# Patient Record
Sex: FEMALE | Race: WHITE | NOT HISPANIC OR LATINO | Employment: FULL TIME | URBAN - METROPOLITAN AREA
[De-identification: names, ages, dates, MRNs, and addresses within clinical notes are randomized per-mention and may not be internally consistent; named-entity substitution may affect disease eponyms.]

---

## 2017-02-01 ENCOUNTER — OFFICE VISIT (OUTPATIENT)
Dept: PAIN MEDICINE | Facility: CLINIC | Age: 42
End: 2017-02-01

## 2017-02-01 VITALS
SYSTOLIC BLOOD PRESSURE: 134 MMHG | TEMPERATURE: 98.7 F | HEIGHT: 68 IN | WEIGHT: 186 LBS | DIASTOLIC BLOOD PRESSURE: 87 MMHG | BODY MASS INDEX: 28.19 KG/M2 | HEART RATE: 68 BPM | OXYGEN SATURATION: 100 % | RESPIRATION RATE: 18 BRPM

## 2017-02-01 DIAGNOSIS — M79.7 FIBROMYALGIA: Primary | ICD-10-CM

## 2017-02-01 DIAGNOSIS — M25.552 HIP PAIN, BILATERAL: ICD-10-CM

## 2017-02-01 DIAGNOSIS — G89.29 OTHER CHRONIC PAIN: ICD-10-CM

## 2017-02-01 DIAGNOSIS — M99.04 SOMATIC DYSFUNCTION OF SACROILIAC JOINT: ICD-10-CM

## 2017-02-01 DIAGNOSIS — M25.551 HIP PAIN, BILATERAL: ICD-10-CM

## 2017-02-01 LAB
POC AMPHETAMINES: NEGATIVE
POC BARBITURATES: NEGATIVE
POC BENZODIAZEPHINES: POSITIVE
POC COCAINE: NEGATIVE
POC METHADONE: NEGATIVE
POC METHAMPHETAMINE SCREEN URINE: NEGATIVE
POC OPIATES: NEGATIVE
POC OXYCODONE: NEGATIVE
POC PHENCYCLIDINE: NEGATIVE
POC PROPOXYPHENE: NEGATIVE
POC THC: NEGATIVE
POC TRICYCLIC ANTIDEPRESSANTS: NEGATIVE

## 2017-02-01 PROCEDURE — 80305 DRUG TEST PRSMV DIR OPT OBS: CPT | Performed by: ANESTHESIOLOGY

## 2017-02-01 PROCEDURE — 99205 OFFICE O/P NEW HI 60 MIN: CPT | Performed by: ANESTHESIOLOGY

## 2017-02-01 RX ORDER — BENZONATATE 100 MG/1
100 CAPSULE ORAL 3 TIMES DAILY
COMMUNITY
Start: 2016-10-15 | End: 2018-04-20

## 2017-02-01 RX ORDER — GUAIFENESIN AND DEXTROMETHORPHAN HYDROBROMIDE 100; 10 MG/5ML; MG/5ML
5 SOLUTION ORAL EVERY 12 HOURS
COMMUNITY
End: 2017-02-01

## 2017-02-01 RX ORDER — CODEINE PHOSPHATE AND GUAIFENESIN 10; 100 MG/5ML; MG/5ML
5 SOLUTION ORAL 4 TIMES DAILY PRN
COMMUNITY
End: 2018-04-20

## 2017-02-01 RX ORDER — LAMOTRIGINE 50 MG/1
50 TABLET, EXTENDED RELEASE ORAL DAILY
Qty: 30 TABLET | Refills: 2 | Status: SHIPPED | OUTPATIENT
Start: 2017-02-01 | End: 2018-04-20

## 2017-02-01 NOTE — PROGRESS NOTES
CHIEF COMPLAINT  Ms. Payne states that she was diagnosed with fibromyalgia in 2000 by her PCP. She states that her pain has progressively gotten worse. She states that the majority of her pain is in her feet, hips and lower back. She states that that she was previously in pain management with Dr. Meyer at [facility] 5 years ago. She states that she was dismissed due to an abnormal med count for fentanyl suckers. She states that she has tried a TENS unit for the back pain and several medications for the fibromyalgia.       Subjective   More Payne is a 41 y.o. female.   She presents to the office for evaluation of FIbromyalgia. She was referred here by CATHY Zuñiga.     She states a history of diagnosis of fibromyalgia.  History of migraine headaches.  Comorbidities include anxiety as well as Bipolar disorder dating back to diagnosis in 2002.  Medical problems include mild hypertension and hypercholesterolemia.  Previously she also followed with [facility] as well as psychiatrist Dr. Vernon.      She became anxious and suicidal with Lyrica use.  She cannot tolerate triptans due to rash and breathing problems.  He has had palpitations increased heart rate with antidepressants.      She is , and works full time as a .  They have 3 children (20,17,15).  She has never smoked.  She denies illicit substance use and denies any at-risk behaviours.      Complains of pain 8-9 on the NRS scale in the feet, with throbbing and cramping and burning and tingling and aching and also soreness, tenderness, and numbness.  Foot pain is severe and is the worst pain complaint.  Pain radiates up the shins and calves.      She also complains of severe bilateral hip pain with all the same qualities; moderate R>L bilateral lumbrosacral area pain radiates into the hips and becomes severe in the hips especially posteriorly.      She also complains of bilateral elbow and forearm  pain.    Mild to moderate headache pain radiates from occiput posteriorly to apex.      Pains are constant, present for years, worse over past 6 months with inciting stress.  Pain makes her feel angry, restless, irritable, and like she should cry.  Sleep is affected with difficulty in falling asleep.  She rates her overall pain as severe.    History of Present Illness     PEG Assessment   What number best describes your pain on average in the past week?8  What number best describes how, during the past week, pain has interfered with your enjoyment of life?5  What number best describes how, during the past week, pain has interfered with your general activity?  5        Current Outpatient Prescriptions:   •  amLODIPine-valsartan (EXFORGE) 5-160 MG per tablet, Take 1 tablet by mouth Daily. For BP (stop Lotrel due to ACE cough), Disp: 30 tablet, Rfl: 5  •  benzonatate (TESSALON PERLES) 100 MG capsule, Take 100 mg by mouth 3 (Three) Times a Day., Disp: , Rfl:   •  guaiFENesin-codeine (ROMILAR-AC) 100-10 MG/5ML syrup, Take 5 mL by mouth 4 (Four) Times a Day As Needed for cough., Disp: , Rfl:   •  Pseudoephedrine-Ibuprofen  MG capsule, Take  by mouth., Disp: , Rfl:   •  LamoTRIgine ER (LAMICTAL XR) 50 MG tablet sustained-release 24 hour, Take 50 mg by mouth Daily., Disp: 30 tablet, Rfl: 2    The following portions of the patient's history were reviewed and updated as appropriate: allergies, current medications, past family history, past medical history, past social history, past surgical history and problem list.      REVIEW OF PERTINENT MEDICAL DATA    The patient was referred here by CATHY Zuñiga for fibromyalgia    Beck Depression Inventory is 18    YOLANDE is a negative study as below    ---      Review of office visit from 12/20/2016 with CATHY Zuñiga 41 y.o. female who presents today for reflare of Fibromyalgia and had work in past from labs with Lyme neg, Rheumatoid neg, neuro neg. Was  "previously in pain management after cause r/o and was Fibromyalgia . This has progressed over last 2mos.  Worse feet and lower ext.  She cannot work. This is d/t fatigue, pain, and weakness.  Pain is worse with ROM.    RIGHT HIP AND RIGHT ELBOW       HISTORY: 39-year-old with fall and pain.      RIGHT HIP      FINDINGS: An AP view of the pelvis as well as AP and frogleg lateral  views of the right hip were obtained. There is no evidence of fracture  or dislocation.      RIGHT ELBOW      FINDINGS: Two views of the right elbow show no evidence of fracture or  dislocation.      Reading Radiologist- MARCELINO PEÑA M.D.  Releasing Radiologist- MARCELINO PEÑA M.D.  Released Date Time- 09/05/14 3120  - T.L.S.    ---      Review of Systems   Constitutional: Positive for fatigue.   HENT: Negative for congestion.    Eyes: Negative for visual disturbance.   Respiratory: Positive for cough. Negative for shortness of breath and wheezing.    Cardiovascular: Negative.    Gastrointestinal: Negative for constipation and diarrhea.   Genitourinary: Negative for difficulty urinating.   Musculoskeletal: Positive for arthralgias (bilateral hips) and back pain.         bilateral foot pain   Neurological: Positive for numbness. Negative for weakness.   Psychiatric/Behavioral: Positive for sleep disturbance. Negative for suicidal ideas. The patient is nervous/anxious.          Vitals:    02/01/17 1008   BP: 134/87   Pulse: 68   Resp: 18   Temp: 98.7 °F (37.1 °C)   SpO2: 100%   Weight: 186 lb (84.4 kg)   Height: 68\" (172.7 cm)   PainSc:   7   PainLoc: Foot         Objective   Physical Exam   Constitutional: She is oriented to person, place, and time. She appears well-developed and well-nourished.   HENT:   Head: Normocephalic.   Right Ear: External ear normal.   Left Ear: External ear normal.   Nose: Nose normal.   Mouth/Throat: Oropharynx is clear and moist.   Eyes: Conjunctivae and EOM are normal. Pupils are equal, " round, and reactive to light.   Neck: Normal range of motion. Neck supple.   Cardiovascular: Normal rate, regular rhythm and normal heart sounds.    Pulmonary/Chest: Effort normal and breath sounds normal.   Abdominal: Soft. Bowel sounds are normal.   Musculoskeletal:        Right hip: She exhibits decreased range of motion (like the left hip, pain on int/ext rot bilaterally).        Left hip: She exhibits decreased range of motion (pain on internal and external rotation, actively and passively). She exhibits normal strength and no crepitus.        Lumbar back: She exhibits bony tenderness. She exhibits normal range of motion, no swelling, no edema and no deformity.        Right foot: There is tenderness. There is no swelling, normal capillary refill and no crepitus.        Left foot: There is tenderness. There is no swelling, normal capillary refill and no deformity.   Castillo maneuver positive bilaterally   Neurological: She is alert and oriented to person, place, and time. She has normal strength and normal reflexes. No cranial nerve deficit or sensory deficit. She displays a negative Romberg sign. Gait normal.   SLR negative, but hamstring tightness present bilaterally   Skin: Skin is warm and dry.   Psychiatric: She has a normal mood and affect. Her behavior is normal.   Nursing note and vitals reviewed.      Assessment/Plan   More was seen today for fibromyalgia.    Diagnoses and all orders for this visit:    Fibromyalgia  -     Urine Drug Screen Confirmation  -     POC Urine Drug Screen, Triage    Hip pain, bilateral  -     XR hip w or wo pelvis 2-3 view left; Future  -     XR hip w or wo pelvis 2-3 view right; Future  -     MRI hip left wo contrast; Future  -     MRI hip right wo contrast; Future  -     Urine Drug Screen Confirmation  -     POC Urine Drug Screen, Triage    Somatic dysfunction of sacroiliac joint  -     Case Request  -     Urine Drug Screen Confirmation  -     POC Urine Drug Screen,  Triage    Other chronic pain  -     Urine Drug Screen Confirmation  -     POC Urine Drug Screen, Triage    Other orders  -     LamoTRIgine ER (LAMICTAL XR) 50 MG tablet sustained-release 24 hour; Take 50 mg by mouth Daily.        --- Follow-up for procedure  -- Bilateral Sacroiliac joint injection, x1, then follow up in office.  This is reasonable for the diagnosis of her second most painful area, the posterior hips including the lumbrosacral area and pelvis  -- she has pain on Castillo maneuver bilaterally which is more painful than the internal/external rotation of the hips  -- Reviewed the procedure at length with the patient.  Included in the review was expectations, complications, risk and benefits.The procedure was described in detail and the risks, benefits and alternatives were discussed with the patient (including but not limited to: bleeding, infection, nerve damage, worsening of pain, inability to perform injection, paralysis, seizures, and death) who agreed to proceed.  Discussed the potential for sedation if warranted/wanted.  Questions were answered and in a way the patient could understand.  Patient verbalized understanding and wishes to proceed.  This intervention will be ordered.    -- trial of Lamictal for Fibromyalgia.    -- this is especially necessary for her most painful complaint which is bilateral foot pain, which may have some peripheral neuropathic elements.    -- this is reasonable as she had her best relief from the use of this in the past.  She failed with gabapentin, pregabalin, levetriacetam, duloxetine, and venlafaxine in the past.  She did not try Savella, but with her history of some instability in the management of bipolar disorder, I do not feel that a trial of Savella is in her best interest.      -- Xray and MRI of Hips bilaterally is very reasonable diagnostically, to better elucidate her history of the bilateral pelvic and low back pain  -- If sacroiliac joint injections are  diagnostically negative, and if imaging does not reveal anything that would preclude hip injections, then we could alternatively perform a bilateral diagnostic intraarticular hip injection bilaterally    -- I do not feel that long term opioid therapy is in her best interest.  She trialed quite high doses of opioids in the past when she treated at [facility] a few years ago, and had problems with fatigue and mental fog.  Opioids are relatively contraindicated in the setting of Fibromyalgia, and are not recommended under CDC guidelines, and are relatively contraindicated in persons with depressive symptomatology           YOLANDE REPORT  YOLANDE report has been reviewed and scanned into the patient's chart.  Date of last YOLANDE : 1-30-17  UDS per routine collected and sent.    No current use of controlled substances.         EMR Dragon/Transcription disclaimer:   Much of this encounter note is an electronic transcription/translation of spoken language to printed text. The electronic translation of spoken language may permit erroneous, or at times, nonsensical words or phrases to be inadvertently transcribed; Although I have reviewed the note for such errors, some may still exist.

## 2017-04-06 ENCOUNTER — APPOINTMENT (OUTPATIENT)
Dept: GENERAL RADIOLOGY | Facility: HOSPITAL | Age: 42
End: 2017-04-06

## 2017-04-06 ENCOUNTER — HOSPITAL ENCOUNTER (EMERGENCY)
Facility: HOSPITAL | Age: 42
Discharge: HOME | End: 2017-04-06
Attending: EMERGENCY MEDICINE | Admitting: EMERGENCY MEDICINE

## 2017-04-06 VITALS
DIASTOLIC BLOOD PRESSURE: 80 MMHG | TEMPERATURE: 99 F | HEIGHT: 68 IN | RESPIRATION RATE: 18 BRPM | OXYGEN SATURATION: 100 % | BODY MASS INDEX: 28.79 KG/M2 | SYSTOLIC BLOOD PRESSURE: 136 MMHG | HEART RATE: 73 BPM | WEIGHT: 190 LBS

## 2017-04-06 DIAGNOSIS — M25.552 BILATERAL HIP PAIN: Primary | ICD-10-CM

## 2017-04-06 DIAGNOSIS — M25.551 BILATERAL HIP PAIN: Primary | ICD-10-CM

## 2017-04-06 PROCEDURE — 73523 X-RAY EXAM HIPS BI 5/> VIEWS: CPT

## 2017-04-06 PROCEDURE — 99282 EMERGENCY DEPT VISIT SF MDM: CPT

## 2017-04-06 PROCEDURE — 73521 X-RAY EXAM HIPS BI 2 VIEWS: CPT

## 2017-04-06 RX ORDER — IBUPROFEN 800 MG/1
800 TABLET ORAL ONCE
Status: DISCONTINUED | OUTPATIENT
Start: 2017-04-06 | End: 2017-04-06 | Stop reason: HOSPADM

## 2017-04-06 RX ORDER — IBUPROFEN 600 MG/1
600 TABLET ORAL EVERY 6 HOURS PRN
Qty: 16 TABLET | Refills: 0 | Status: SHIPPED | OUTPATIENT
Start: 2017-04-06 | End: 2018-04-20

## 2017-04-06 NOTE — DISCHARGE INSTRUCTIONS
You are advised to follow closely with Dr Ferguson in 2-3 days for recheck, final results of lab work and imaging testing, and further testing/treatment as needed.  Heat and gentle stretching.  Drink plenty of fluids.      Please return to the emergency department immediately with chest pain different than usual for you, shortness of air, abdominal pain, persistent vomiting/fever, blood in emesis or stool, lightheadedness/fainting, problems with speech, one sided weakness/numbness, new incontinence, problems with vision, swelling or for worsening of symptoms or other concerns.

## 2017-04-06 NOTE — ED PROVIDER NOTES
" EMERGENCY DEPARTMENT ENCOUNTER    CHIEF COMPLAINT  Chief Complaint: RLE pain/weakness  History given by: patient  History limited by: nothing  Room Number: 01/01  PMD: Kelley Ferguson PA-C      HPI:  Pt is a 41 y.o. female who presents complaining of constant R leg pain, weakness and numbness sudden onset 3 hours ago. She states that the numbness does not radiate to her R foot. She also c/o \"shooting\" L hip pain onset this morning, difficulty ambulating secondary to RLE weakness. She denies any vision/speech changes, BUE sx, dysuria, fever, SOA, chest pain, abd pain. She states earlier this week she had pain in posterior R lower leg. She has hx of hip pain, fibromyalgia and migraine but denies hx of hypertension, diabetes, hypercholesteremia. LNMP 2/27/17. She denies any possibility of pregnancy stating irregular menses.  Pt states she was sitting at the time of onset, no injury.    Duration:  3 hours  Onset: sudden  Timing: constant  Location: RLE  Radiation: none  Quality: no pain, but   Intensity/Severity: severe  Progression: unchanged  Associated Symptoms: R greater than L hip pain  Aggravating Factors: none  Alleviating Factors: none  Previous Episodes: hx of hip pain  Treatment before arrival: none    PAST MEDICAL HISTORY  Active Ambulatory Problems     Diagnosis Date Noted   • Anxiety    • Bipolar disorder    • Dysphagia    • Fibromyalgia    • Migraine    • Tension headache    • Thyroid disorder    • Essential hypertension 01/08/2016     Resolved Ambulatory Problems     Diagnosis Date Noted   • No Resolved Ambulatory Problems     Past Medical History:   Diagnosis Date   • Anxiety    • Bipolar disorder    • Dysphagia    • Fibromyalgia    • GERD (gastroesophageal reflux disease)    • History of colonoscopy    • History of CT scan of head 09/23/2011   • History of diagnostic ultrasound 10/22/2013   • History of mammogram    • Metatarsalgia    • Migraine    • Tension headache    • Thyroid disorder        PAST " SURGICAL HISTORY  Past Surgical History:   Procedure Laterality Date   •  SECTION      x3   • CYST REMOVAL     • ENDOSCOPY  2013    +gastritis   • MANDIBLE SURGERY         FAMILY HISTORY  Family History   Problem Relation Age of Onset   • Fibromyalgia Mother    • Lung disease Mother    • Cancer Maternal Grandmother      lung   • Asthma Other    • Depression Other    • Heart disease Other    • Hypertension Other    • Mental illness Other    • Cancer Other      throat       SOCIAL HISTORY  Social History     Social History   • Marital status:      Spouse name: N/A   • Number of children: N/A   • Years of education: N/A     Occupational History   • Not on file.     Social History Main Topics   • Smoking status: Never Smoker   • Smokeless tobacco: Never Used   • Alcohol use Yes      Comment: occasional   • Drug use: No   • Sexual activity: Not on file     Other Topics Concern   • Not on file     Social History Narrative       ALLERGIES  Biaxin [clarithromycin]; Imitrex [sumatriptan]; Sulfa antibiotics; Toradol [ketorolac tromethamine]; Treximet [sumatriptan-naproxen sodium]; Zithromax [azithromycin]; and Triptans    REVIEW OF SYSTEMS  Review of Systems   Constitutional: Negative for chills and fever.   HENT: Negative for sore throat and trouble swallowing.    Eyes: Negative for visual disturbance.   Respiratory: Negative for cough and shortness of breath.    Cardiovascular: Negative for chest pain, palpitations and leg swelling.   Gastrointestinal: Negative for abdominal pain, diarrhea and vomiting.   Endocrine: Negative.    Genitourinary: Negative for decreased urine volume, dysuria and frequency.   Musculoskeletal: Positive for arthralgias (L hip), gait problem (secondary to RLE weakness) and myalgias (LLE). Negative for neck pain.   Skin: Negative for rash.   Allergic/Immunologic: Negative.    Neurological: Positive for weakness (RLE) and numbness (RLE). Negative for syncope and headaches.    Hematological: Negative.    Psychiatric/Behavioral: Negative.    All other systems reviewed and are negative.      PHYSICAL EXAM  ED Triage Vitals   Temp Heart Rate Resp BP SpO2   04/06/17 1300 04/06/17 1300 04/06/17 1300 -- 04/06/17 1300   98.2 °F (36.8 °C) 67 18  100 %      Temp src Heart Rate Source Patient Position BP Location FiO2 (%)   04/06/17 1300 04/06/17 1300 -- -- --   Tympanic Monitor          Physical Exam   Constitutional: She is oriented to person, place, and time. She appears distressed (mild).   HENT:   Head: Normocephalic and atraumatic.   Eyes: EOM are normal.   Neck: Normal range of motion. Neck supple.   Cardiovascular: Normal rate, regular rhythm, normal heart sounds and intact distal pulses.    No murmur heard.  Pulses:       Posterior tibial pulses are 2+ on the right side, and 2+ on the left side.   Pulmonary/Chest: Effort normal and breath sounds normal. No respiratory distress.   Abdominal: Soft. Bowel sounds are normal. There is no tenderness. There is no rebound and no guarding.   Musculoskeletal: Normal range of motion. She exhibits no edema.        Right hip: She exhibits tenderness. She exhibits normal strength, no bony tenderness, no swelling and no deformity.        Lumbar back: She exhibits tenderness (R lateral).   Pt has pain with ROM right hip, states sensation intact bilat lower ext at time of exam   Neurological: She is alert and oriented to person, place, and time. She has normal sensation and normal strength. No sensory deficit. She has a normal Straight Leg Raise Test.   Normal neuro exam. Pt is able to hold up her R leg and to internally rotate at the hip to demonstrate where discomfort is.   Skin: Skin is warm and dry.   Psychiatric: Affect normal.   Nursing note and vitals reviewed.      LAB RESULTS  Lab Results (last 24 hours)     ** No results found for the last 24 hours. **        I ordered the above labs and reviewed the results      RADIOLOGY  XR Hips Bilateral  With or Without Pelvis 5 View   Final Result   Negative pelvis and hip x-rays. If there is clinical concern   for acetabular labrum tear, followup with arthrogram MRI might be   useful.       This report was finalized on 4/6/2017 4:10 PM by Dr. Eliud Varghese MD.            I ordered the above noted radiological studies. Interpreted by radiologist. Reviewed by me in PACS.       PROCEDURES  Procedures      PROGRESS AND CONSULTS  ED Course     1320  Ordered XR bilateral hips for further evaluation.    1711  Rechecked pt who is resting comfortably. D/w pt negative XR. Pt states Toradol causes SOA and itching and that she does not tolerate muscle relaxers well - states she is able to take ibuprofen without difficulty. D/w pt plan to give ibuprofen and d/c w/ f/u w/ PCP and rx for ibuprofen. She declines ibuprofen. Gave RTER warnings. Pt understands and agrees with the plan, encouraged to follow closely with her dr for referral to PT or other specialist as needed for persistent symptoms. All questions answered.    Follow up - ERIC Rubalcava states she witnessed pt ambulating across the parking lot to her car post discharge.      MEDICAL DECISION MAKING  Results were reviewed/discussed with the patient and they were also made aware of online access. Pt also made aware that some labs, such as cultures, will not be resulted during ER visit and follow up with PMD is necessary.     MDM  Number of Diagnoses or Management Options     Amount and/or Complexity of Data Reviewed  Tests in the radiology section of CPT®: ordered and reviewed (XR hips is negative acute.)  Decide to obtain previous medical records or to obtain history from someone other than the patient: yes  Review and summarize past medical records: yes  Independent visualization of images, tracings, or specimens: yes    Patient Progress  Patient progress: stable         DIAGNOSIS  Final diagnoses:   Bilateral hip pain       DISPOSITION  DISCHARGE    Patient discharged  in stable condition.    Reviewed implications of results, diagnosis, meds, responsibility to follow up, warning signs and symptoms of possible worsening, potential complications and reasons to return to ER, including worsening weakness, pain, numbness.    Patient/Family voiced understanding of above instructions.    Discussed plan for discharge, as there is no emergent indication for admission.  Pt/family is agreeable and understands need for follow up and repeat testing.  Pt is aware that discharge does not mean that nothing is wrong but it indicates no emergency is present that requires admission and they must continue care with follow-up as given below or physician of their choice.     FOLLOW-UP  Kelley Ferguson PA-C  [id number] [facility]  [id number]  [place] [address]  [phone]    Call in 3 days  For follow-up, EVEN IF WELL         Medication List      New Prescriptions          ibuprofen 600 MG tablet   Commonly known as:  ADVIL,MOTRIN   Take 1 tablet by mouth Every 6 (Six) Hours As Needed for Mild Pain (1-3)   or Moderate Pain (4-6) (take with food).         Changed          amLODIPine-valsartan 5-160 MG per tablet   Commonly known as:  EXFORGE   Take 1 tablet by mouth Daily. For BP (stop Lotrel due to ACE cough)   What changed:    - when to take this  - reasons to take this  - additional instructions         Stop          guaiFENesin-codeine 100-10 MG/5ML syrup   Commonly known as:  ROMILAR-AC       LamoTRIgine ER 50 MG tablet sustained-release 24 hour   Commonly known as:  LAMICTAL XR       Pseudoephedrine-Ibuprofen  MG capsule       TESSALON PERLES 100 MG capsule   Generic drug:  benzonatate           Latest Documented Vital Signs:  As of 5:47 PM  BP- 136/80 HR- 73 Temp- 99 °F (37.2 °C) (Tympanic) O2 sat- 100%    --  Documentation assistance provided by brian Moya for Dr. Skinner.  Information recorded by the brian was done at my direction and has been verified and validated by me.      Jagdish Moya  04/06/17 6742       Elin Skinner MD  04/07/17 3548

## 2017-04-10 ENCOUNTER — TELEPHONE (OUTPATIENT)
Dept: SOCIAL WORK | Facility: HOSPITAL | Age: 42
End: 2017-04-10

## 2017-08-28 ENCOUNTER — APPOINTMENT (OUTPATIENT)
Dept: GENERAL RADIOLOGY | Facility: HOSPITAL | Age: 42
End: 2017-08-28

## 2017-08-28 PROCEDURE — 73562 X-RAY EXAM OF KNEE 3: CPT | Performed by: FAMILY MEDICINE

## 2017-08-28 PROCEDURE — 72220 X-RAY EXAM SACRUM TAILBONE: CPT | Performed by: FAMILY MEDICINE

## 2017-09-05 ENCOUNTER — TRANSCRIBE ORDERS (OUTPATIENT)
Dept: PHYSICAL THERAPY | Facility: CLINIC | Age: 42
End: 2017-09-05

## 2017-09-05 DIAGNOSIS — M54.5 LOW BACK PAIN, UNSPECIFIED BACK PAIN LATERALITY, UNSPECIFIED CHRONICITY, WITH SCIATICA PRESENCE UNSPECIFIED: Primary | ICD-10-CM

## 2017-09-08 ENCOUNTER — TREATMENT (OUTPATIENT)
Dept: PHYSICAL THERAPY | Facility: CLINIC | Age: 42
End: 2017-09-08

## 2017-09-08 DIAGNOSIS — S39.012D LUMBAR STRAIN, SUBSEQUENT ENCOUNTER: Primary | ICD-10-CM

## 2017-09-08 DIAGNOSIS — M53.3 SACROILIAC JOINT DYSFUNCTION: ICD-10-CM

## 2017-09-08 PROCEDURE — 97140 MANUAL THERAPY 1/> REGIONS: CPT | Performed by: PHYSICAL THERAPIST

## 2017-09-08 PROCEDURE — 97530 THERAPEUTIC ACTIVITIES: CPT | Performed by: PHYSICAL THERAPIST

## 2017-09-08 PROCEDURE — 97001 PR PHYS THERAPY EVALUATION: CPT | Performed by: PHYSICAL THERAPIST

## 2017-09-08 PROCEDURE — 97014 ELECTRIC STIMULATION THERAPY: CPT | Performed by: PHYSICAL THERAPIST

## 2017-09-08 PROCEDURE — 97110 THERAPEUTIC EXERCISES: CPT | Performed by: PHYSICAL THERAPIST

## 2017-09-08 NOTE — PROGRESS NOTES
[facility]  Physical Therapy  Orthopedic / Sports / Industrial Physical Therapy  Physical Therapy Initial Evaluation and Plan of Care    Patient Name: More Payne          :  1975  Referring Physician: Sebastián Mariscal MD  Diagnosis: Lumbar strain, subsequent encounter [S39.012D]  ; SI Jt Dysfunction;   Date of Evaluation: 2017  ______________________________________________________________________  Subjective Evaluation    History of Present Illness  Date of onset: 2017  Mechanism of injury: Unloading truck - Fell between trailer and loading dock - LLE HE and RLE went between trailer and dock -       Pain  Current pain ratin  At worst pain ratin  Location: LBP - Central - and sacral region - (B) LE  and feet (R>L) numnbess - pain into (R) hip as well now -   Quality: Throbbing  / stabbing / shooting pain down legs.  Alleviating factors: Ice; ibuprofen / Meds;   Exacerbated by: Walking, sit, stand, bending - Numbness worse with sitting -   Progression: no change    Treatments  Current treatment: medication  Patient Goals  Patient/family treatment goals: Pain / numnbess alleviation; Normal mobility, gait, function - RTW w/ o restrictions -          ___________________________________________________  Objective     Postural Observations  Standing posture: (R) Ilium / ASIS / PSIS  higher vs (L); Hyperlordodic posture;         Active Range of Motion     Additional Active Range of Motion Details  Limited and painful SB L>R with pain (R) LSS / SI region;   Limited extension with pain central / (R) LSS region    Tests     Additional Tests Details  (+) Slump w/ Knee Ext (R)  (+) SI Jt Dysfunction (R) / Upshear         MODALITIES: %, 2.0w/cm2, 1.0 x 10 min to (R) LSS / SI region   E-STIM (IFC) to LSS x 20 min  MANUAL THERAPY x 10 min; Mobilization (R) SI Jt to address Upshear -   THERAPEUTIC EXERCISE: SKC, PIRIFORMIS, MODIFIED GLUTE  stretching 5/30 sec ea; LTR 6/10 sec ea  FUNCTIONAL  ACTIVITIES: X 10 min  · TAPING / BRACING: NA  · Jt protection, ADL modification; Posture and     ___________________________________________________  Assessment & Plan     Assessment  Assessment details:   Lumbar strain; LBP; SI Jt Dysfunction -     PROBLEMS: Pain; Limited mobility, gait, function, and unable to perform regular job -   PROGNOSIS: Good     GOALS:   SHORT TERM GOALS: 2 weeks:  1) HEP Initiated; 2) Pain decreased 50%:   3) AROM  increased:  4) Improved functional ability grossly;     LONG TERM GOALS: 4 weeks (or at time of DISCHARGE): 1) (I) HEP; 2) AROM WFL and pain free; 3) Strength / mobility to be able to perform all ADL's and job-related activities w/o restrictions;       Plan  Planned therapy interventions: abdominal trunk stabilization, flexibility, home exercise program, therapeutic activities, stretching, strengthening, spinal/joint mobilization, soft tissue mobilization, postural training, neuromuscular re-education and manual therapy (Modalities prn including lumbar traction prn - )  Frequency: 3x week  Duration in weeks: 4  Treatment plan discussed with: patient        ___________________________________________________  Manual Therapy:    10     mins  24121;   Therapeutic Exercise:    15     mins  26511;     Neuromuscular Nico:    NA    mins  95229;   Therapeutic Activity:     10     mins  03587;     Ultrasound:     10     mins  00034;    Electrical Stimulation:   20     mins  35537 ( );  Dry Needling     NA     mins self-pay       EVAL TIME:   25 mins    Timed Treatment:   45   mins                Total Treatment:     95   mins    PT SIGNATURE:   Jonathon Reed, PT  DATE TREATMENT INITIATED: 9/8/2017  ___________________________________________________  Initial Certification  Certification Period: 12/7/2017  I certify that the therapy services are furnished while this patient is under my care.  The services outlined above are required by this patient, and will be reviewed  every 90 days.     PHYSICIAN: ________________________________  DATE: ______  Sebastián Mariscal MD        Please sign and return via fax to [phone].. Thank you, [facility].  ______________________________________________________________________  [address] [place] [address]  Phone: [phone] Fax: [phone]

## 2017-09-11 ENCOUNTER — TREATMENT (OUTPATIENT)
Dept: PHYSICAL THERAPY | Facility: CLINIC | Age: 42
End: 2017-09-11

## 2017-09-11 DIAGNOSIS — S39.012D LUMBAR STRAIN, SUBSEQUENT ENCOUNTER: Primary | ICD-10-CM

## 2017-09-11 DIAGNOSIS — M53.3 SACROILIAC JOINT DYSFUNCTION: ICD-10-CM

## 2017-09-11 PROCEDURE — 97530 THERAPEUTIC ACTIVITIES: CPT | Performed by: PHYSICAL THERAPIST

## 2017-09-11 PROCEDURE — 97110 THERAPEUTIC EXERCISES: CPT | Performed by: PHYSICAL THERAPIST

## 2017-09-11 PROCEDURE — 97140 MANUAL THERAPY 1/> REGIONS: CPT | Performed by: PHYSICAL THERAPIST

## 2017-09-11 NOTE — PROGRESS NOTES
"  [facility]  Physical Therapy   ------------------------------------------------------------------------------------------------------   MD PROGRESS NOTE    Patient: More Payne        : 1975  Diagnosis/ICD-10 Code:  Lumbar strain, subsequent encounter [S39.012D]  Referring practitioner: Sebastián Mariscal MD  Date of Initial Visit: 2017                  Today's Date: 2017  _________________________________________________________________    Thank you for the referral of Ms. Payne to [facility].  Ms. Payne has attended 2 PT sessions and their treatment has consisted of: modalities prn, manual therapy, therapeutic exercise, patient education, and HEP.     Subjective   More Payne reports: waking up Saturday and \"could not move\" - Pain (R) LSS / SI region with limited mobility, gait, and function - Pain constant with movement and at rest -   ___________________________________________________________________  Objective              Painful / guarded posture, gait, and transitional movements -    (R) Ilium / ASIS / PSIS higher vs (L)   Limited AROM all planes with increased pain (R) LSS/ SI region -  Minimal reversal of lumbar lordosis with  flexion -    (+) Anterior Ilium Rotation (R);  (+) Upshear (R) SI / Ilium;  (+) Flexed sacrum;               Limited tolerance to ADL's and job related activities -    ___________________________________________________________________   Assessment/Plan  Lumbar strain; LBP; SI Jt Dysfunction   Persistent pain, but improved mobility and alignment after MT today -   Ms. Payne would benefit from continued Physical Therapy.     P: Recommend continued Physical Therapy to allow a full and safe return to ADL's and normal job duties.    Please advise after your exam.    Thank you again for this referral of Ms. Payne to [facility].    PT Signature: ______________________________   Jonathon Reed, " PT    ______________________________________________________________________  [id number] [facility]  [place] [address]  Phone: [phone] Fax: [phone]

## 2017-09-11 NOTE — PROGRESS NOTES
Physical Therapy Daily Progress Note    Patient Name: More Payne         :  1975  Referring Physician: Sebastián Mariscal MD    Subjective   More Payne reports: sore after PT and did HEP, but woke up on Saturday and could not move - Pain in LB (R)     Objective   Painful / guarded posture, gait, and transitional movements -   (R) Ilium / ASIS / PSIS higher vs (L)  Limited AROM all planes with increased pain (R) LSS/ SI region -  Minimal reversal of lumbar lordosis with flexion -   (+) Anterior Ilium Rotation (R);  (+) Upshear (R) SI / Ilium;  (+) Flexed sacrum;    See Exercise, Manual, and Modality Logs for complete treatment.     Functional / Therapeutic Activities:  10 min  · TAPING / BRACING: NA  · SEE EXERCISE FLOW SHEET -   · Jt protection, ADL modification; Posture and      Assessment/Plan  Lumbar strain; LBP; SI Jt Dysfunction   Persistent pain, but improved mobility and alignment after MT today -     Progress strengthening /stabilization /functional activity     _________________________________________________  Manual Therapy:    30     mins  90726;  Therapeutic Exercise:    30     mins  76940;     Neuromuscular Nico:    NA    mins  25242;    Therapeutic Activity:     10     mins  83126;     Gait Training:      NA     mins  86259;     Ultrasound:     10     mins  99918;    Electrical Stimulation:    20     mins  34117 ( );  Dry Needling     NA     mins self-pay    Timed Treatment:   80   mins                  Total Treatment:     110   mins    Jonathon Reed PT  Physical Therapist

## 2017-09-12 ENCOUNTER — TRANSCRIBE ORDERS (OUTPATIENT)
Dept: OCCUPATIONAL THERAPY | Facility: CLINIC | Age: 42
End: 2017-09-12

## 2017-09-12 DIAGNOSIS — S39.012A LUMBAR STRAIN, INITIAL ENCOUNTER: Primary | ICD-10-CM

## 2017-09-12 DIAGNOSIS — S33.8XXA SACRAL SPRAIN, INITIAL ENCOUNTER: ICD-10-CM

## 2017-10-12 ENCOUNTER — OFFICE VISIT (OUTPATIENT)
Dept: FAMILY MEDICINE CLINIC | Facility: CLINIC | Age: 42
End: 2017-10-12

## 2017-10-12 VITALS
OXYGEN SATURATION: 95 % | HEART RATE: 75 BPM | RESPIRATION RATE: 18 BRPM | SYSTOLIC BLOOD PRESSURE: 150 MMHG | HEIGHT: 68 IN | DIASTOLIC BLOOD PRESSURE: 93 MMHG | WEIGHT: 192 LBS | BODY MASS INDEX: 29.1 KG/M2 | TEMPERATURE: 98.6 F

## 2017-10-12 DIAGNOSIS — J40 BRONCHITIS: Primary | ICD-10-CM

## 2017-10-12 PROCEDURE — 99213 OFFICE O/P EST LOW 20 MIN: CPT | Performed by: NURSE PRACTITIONER

## 2017-10-12 RX ORDER — PREDNISONE 20 MG/1
20 TABLET ORAL 2 TIMES DAILY
Qty: 10 TABLET | Refills: 0 | Status: SHIPPED | OUTPATIENT
Start: 2017-10-12 | End: 2018-04-20

## 2017-10-12 RX ORDER — FLUCONAZOLE 150 MG/1
150 TABLET ORAL ONCE
Qty: 1 TABLET | Refills: 2 | Status: SHIPPED | OUTPATIENT
Start: 2017-10-12 | End: 2017-10-12

## 2017-10-12 RX ORDER — AZITHROMYCIN 250 MG/1
TABLET, FILM COATED ORAL
Qty: 6 TABLET | Refills: 0 | Status: SHIPPED | OUTPATIENT
Start: 2017-10-12 | End: 2018-04-20

## 2017-10-12 RX ORDER — DEXTROMETHORPHAN HYDROBROMIDE AND PROMETHAZINE HYDROCHLORIDE 15; 6.25 MG/5ML; MG/5ML
5 SYRUP ORAL NIGHTLY PRN
Qty: 120 ML | Refills: 0 | Status: SHIPPED | OUTPATIENT
Start: 2017-10-12 | End: 2017-10-22

## 2017-10-12 NOTE — PROGRESS NOTES
Subjective   More Payne is a 42 y.o. female.     History of Present Illness   More Payne 42 y.o. female who presents for evaluation of acute bronchitis. Symptoms include dry cough, exertional SOA and wheezing.  Onset of symptoms was several days ago, gradually worsening since that time. Patient denies fever.   Evaluation to date: none Treatment to date:  OTC oral decongestants, OTC cough suppressant and Advil.     The following portions of the patient's history were reviewed and updated as appropriate: allergies, current medications, past family history, past medical history, past social history, past surgical history and problem list.    Review of Systems   Constitutional: Negative for chills and fever.   HENT: Negative for congestion, sinus pain, sinus pressure and sore throat.    Respiratory: Positive for cough, chest tightness, shortness of breath and wheezing.        Objective   Physical Exam   Constitutional: She is oriented to person, place, and time. She appears well-developed and well-nourished.   Cardiovascular: Normal rate and regular rhythm.    Pulmonary/Chest: Effort normal. She has decreased breath sounds in the right lower field.   Neurological: She is alert and oriented to person, place, and time.   Psychiatric: She has a normal mood and affect. Judgment normal.   Nursing note and vitals reviewed.      Assessment/Plan   More was seen today for cough.    Diagnoses and all orders for this visit:    Bronchitis  -     predniSONE (DELTASONE) 20 MG tablet; Take 1 tablet by mouth 2 (Two) Times a Day.  -     promethazine-dextromethorphan (PROMETHAZINE-DM) 6.25-15 MG/5ML syrup; Take 5 mL by mouth At Night As Needed for Cough for up to 10 days.  -     azithromycin (ZITHROMAX Z-RAMSES) 250 MG tablet; Take 2 tablets the first day, then 1 tablet daily for 4 days.  -     fluconazole (DIFLUCAN) 150 MG tablet; Take 1 tablet by mouth 1 (One) Time for 1 dose. One PO X 1 dose for yeast infection            Patient states she did not have allergic reaction to zithromax or biaxin, just that she tends to get yeast infection.  She denies rash or hives.   Patient to take probiotic with antibiotic.

## 2018-04-20 ENCOUNTER — APPOINTMENT (OUTPATIENT)
Dept: GENERAL RADIOLOGY | Facility: HOSPITAL | Age: 43
End: 2018-04-20

## 2018-04-20 PROCEDURE — 73130 X-RAY EXAM OF HAND: CPT | Performed by: EMERGENCY MEDICINE

## 2018-05-23 ENCOUNTER — OFFICE VISIT (OUTPATIENT)
Dept: FAMILY MEDICINE CLINIC | Facility: CLINIC | Age: 43
End: 2018-05-23

## 2018-05-23 VITALS
HEART RATE: 63 BPM | WEIGHT: 194 LBS | HEIGHT: 68 IN | TEMPERATURE: 97.9 F | DIASTOLIC BLOOD PRESSURE: 74 MMHG | BODY MASS INDEX: 29.4 KG/M2 | OXYGEN SATURATION: 100 % | RESPIRATION RATE: 16 BRPM | SYSTOLIC BLOOD PRESSURE: 120 MMHG

## 2018-05-23 DIAGNOSIS — R60.0 PEDAL EDEMA: ICD-10-CM

## 2018-05-23 DIAGNOSIS — Z00.00 LABORATORY EXAMINATION ORDERED AS PART OF A ROUTINE GENERAL MEDICAL EXAMINATION: ICD-10-CM

## 2018-05-23 DIAGNOSIS — R53.83 FATIGUE, UNSPECIFIED TYPE: Primary | ICD-10-CM

## 2018-05-23 PROCEDURE — 99213 OFFICE O/P EST LOW 20 MIN: CPT | Performed by: PHYSICIAN ASSISTANT

## 2018-05-23 NOTE — PATIENT INSTRUCTIONS
Low glycemic index diet  Exercise 30 minutes most days of the week  Make sure you get results on any labs or tests we ordered today  We discussed medications and how to take them as prescribed  Sleep 6-8 hours each night if possible  If you have not signed up for [employer], please activate your code ASAP from your After Visit Summary today    LDL goal <100  LDL goal if heart disease <70  HDL goal >60  Triglyceride goal <150  BP goal =<130/80  Fasting glucose <100

## 2018-05-23 NOTE — PROGRESS NOTES
Subjective   More Payne is a 43 y.o. female.     History of Present Illness   Fatigue  Patient complains of fatigue. Symptoms began several months ago. The patient feels the fatigue began with: nothing specific.   Symptoms of her fatigue have been associated with edema in last 2 weeks. Patient describes the following psychological symptoms: none. Patient denies change in hair texture, cold intolerance, constipation, GI blood loss, significant change in weight, unusual rashes, witnessed or suspected sleep apnea and depression. Symptoms have gradually worsened. Symptom severity: moderate. Previous visits for this problem: none.   Lab Results   Component Value Date    TSH 3.330 07/05/2016   soreness in feet for 6mos    I will need to update labs---all of them  Family hx thyroid  She does have heavy periods and has for years  Sleeping well at night    Tired 2 mos and not depressed; no snoring that wakes her up  Edema in legs in last 2 weeks    3 weeks went to [place]; no tick noted; stayed in cabin      Watch salt and try stockings  Need to consider sleep study  The following portions of the patient's history were reviewed and updated as appropriate: allergies, current medications, past family history, past medical history, past social history, past surgical history and problem list.    Review of Systems   Constitutional: Positive for fatigue. Negative for activity change, appetite change and unexpected weight change.   HENT: Negative for nosebleeds and trouble swallowing.    Eyes: Negative for pain and visual disturbance.   Respiratory: Positive for cough and choking. Negative for chest tightness, shortness of breath and wheezing.    Cardiovascular: Positive for leg swelling. Negative for chest pain and palpitations.   Gastrointestinal: Negative for abdominal pain and blood in stool.   Endocrine: Negative.    Genitourinary: Negative for difficulty urinating and hematuria.   Musculoskeletal: Positive for  myalgias. Negative for joint swelling.   Skin: Negative for color change and rash.   Allergic/Immunologic: Negative.    Neurological: Positive for headaches. Negative for syncope and speech difficulty.   Hematological: Negative for adenopathy.   Psychiatric/Behavioral: Negative for agitation and confusion. The patient is nervous/anxious.    All other systems reviewed and are negative.      Objective   Physical Exam   Constitutional: She is oriented to person, place, and time. She appears well-developed and well-nourished. No distress.   HENT:   Head: Normocephalic and atraumatic.   Right Ear: External ear normal.   Left Ear: External ear normal.   Nose: Nose normal.   Mouth/Throat: Oropharynx is clear and moist. No oropharyngeal exudate.   Eyes: Conjunctivae and EOM are normal. Pupils are equal, round, and reactive to light. No scleral icterus.   Neck: Normal range of motion. Neck supple. No thyromegaly present.   Cardiovascular: Normal rate, regular rhythm, normal heart sounds and intact distal pulses.    No murmur heard.  Pulmonary/Chest: Effort normal and breath sounds normal. No respiratory distress. She has no wheezes. She has no rales.   Abdominal: Soft. Bowel sounds are normal. There is no tenderness.   No organomeg   Musculoskeletal: Normal range of motion. She exhibits no deformity. Edema: trace pedal.   Lymphadenopathy:     She has no cervical adenopathy.   Neurological: She is alert and oriented to person, place, and time. Coordination normal.   Skin: Skin is warm and dry. No rash noted. She is not diaphoretic.   Psychiatric: She has a normal mood and affect. Her behavior is normal. Judgment and thought content normal.   Nursing note and vitals reviewed.      Assessment/Plan   Problems Addressed this Visit     None      Visit Diagnoses     Fatigue, unspecified type    -  Primary    Relevant Orders    T3, Free    T4, Free    Vitamin D 25 Hydroxy    Vitamin B12    Folate    Comprehensive metabolic panel     Lipid panel    CBC and Differential    TSH    Ferritin    [facility] (IgG / M)    B. Burgdorferi Antibodies, WB Reflex    Nissa-Barr Virus VCA Antibody Panel    Laboratory examination ordered as part of a routine general medical examination        Relevant Orders    T3, Free    T4, Free    Vitamin D 25 Hydroxy    Vitamin B12    Folate    Comprehensive metabolic panel    Lipid panel    CBC and Differential    TSH    Ferritin    [facility] (IgG / M)    B. Burgdorferi Antibodies, WB Reflex    Nissa-Barr Virus VCA Antibody Panel    Pedal edema        Relevant Orders    T3, Free    T4, Free    Vitamin D 25 Hydroxy    Vitamin B12    Folate    Comprehensive metabolic panel    Lipid panel    CBC and Differential    TSH    Ferritin    [facility] (IgG / M)    B. Burgdorferi Antibodies, WB Reflex    Nissa-Barr Virus VCA Antibody Panel

## 2018-05-28 LAB
25(OH)D3+25(OH)D2 SERPL-MCNC: 34.6 NG/ML (ref 30–100)
ALBUMIN SERPL-MCNC: 4.5 G/DL (ref 3.5–5.2)
ALBUMIN/GLOB SERPL: 1.4 G/DL
ALP SERPL-CCNC: 66 U/L (ref 39–117)
ALT SERPL-CCNC: 25 U/L (ref 1–33)
AST SERPL-CCNC: 20 U/L (ref 1–32)
B BURGDOR IGG PATRN SER IB-IMP: NEGATIVE
B BURGDOR IGG+IGM SER-ACNC: <0.91 ISR (ref 0–0.9)
B BURGDOR IGM PATRN SER IB-IMP: NEGATIVE
B BURGDOR IGM SER IA-ACNC: 0.9 INDEX (ref 0–0.79)
B BURGDOR18KD IGG SER QL IB: NORMAL
B BURGDOR23KD IGG SER QL IB: NORMAL
B BURGDOR23KD IGM SER QL IB: NORMAL
B BURGDOR28KD IGG SER QL IB: NORMAL
B BURGDOR30KD IGG SER QL IB: NORMAL
B BURGDOR39KD IGG SER QL IB: NORMAL
B BURGDOR39KD IGM SER QL IB: NORMAL
B BURGDOR41KD IGG SER QL IB: NORMAL
B BURGDOR41KD IGM SER QL IB: NORMAL
B BURGDOR45KD IGG SER QL IB: NORMAL
B BURGDOR58KD IGG SER QL IB: NORMAL
B BURGDOR66KD IGG SER QL IB: NORMAL
B BURGDOR93KD IGG SER QL IB: NORMAL
BASOPHILS # BLD AUTO: 0.02 10*3/MM3 (ref 0–0.2)
BASOPHILS NFR BLD AUTO: 0.4 % (ref 0–1.5)
BILIRUB SERPL-MCNC: 0.6 MG/DL (ref 0.1–1.2)
BUN SERPL-MCNC: 12 MG/DL (ref 6–20)
BUN/CREAT SERPL: 15.4 (ref 7–25)
CALCIUM SERPL-MCNC: 9.8 MG/DL (ref 8.6–10.5)
CHLORIDE SERPL-SCNC: 98 MMOL/L (ref 98–107)
CHOLEST SERPL-MCNC: 258 MG/DL (ref 0–200)
CO2 SERPL-SCNC: 27.4 MMOL/L (ref 22–29)
CREAT SERPL-MCNC: 0.78 MG/DL (ref 0.57–1)
EBV EA IGG SER-ACNC: <9 U/ML (ref 0–8.9)
EBV NA IGG SER IA-ACNC: 24.4 U/ML (ref 0–17.9)
EBV VCA IGG SER IA-ACNC: 371 U/ML (ref 0–17.9)
EBV VCA IGM SER IA-ACNC: <36 U/ML (ref 0–35.9)
EOSINOPHIL # BLD AUTO: 0.12 10*3/MM3 (ref 0–0.7)
EOSINOPHIL NFR BLD AUTO: 2.1 % (ref 0.3–6.2)
ERYTHROCYTE [DISTWIDTH] IN BLOOD BY AUTOMATED COUNT: 13.9 % (ref 11.7–13)
FERRITIN SERPL-MCNC: 12.68 NG/ML (ref 13–150)
FOLATE SERPL-MCNC: 11.02 NG/ML (ref 4.78–24.2)
GFR SERPLBLD CREATININE-BSD FMLA CKD-EPI: 81 ML/MIN/1.73
GFR SERPLBLD CREATININE-BSD FMLA CKD-EPI: 98 ML/MIN/1.73
GLOBULIN SER CALC-MCNC: 3.2 GM/DL
GLUCOSE SERPL-MCNC: 90 MG/DL (ref 65–99)
HCT VFR BLD AUTO: 40.7 % (ref 35.6–45.5)
HDLC SERPL-MCNC: 53 MG/DL (ref 40–60)
HGB BLD-MCNC: 13.5 G/DL (ref 11.9–15.5)
IMM GRANULOCYTES # BLD: 0.01 10*3/MM3 (ref 0–0.03)
IMM GRANULOCYTES NFR BLD: 0.2 % (ref 0–0.5)
LDLC SERPL CALC-MCNC: 176 MG/DL (ref 0–100)
LYMPHOCYTES # BLD AUTO: 1.55 10*3/MM3 (ref 0.9–4.8)
LYMPHOCYTES NFR BLD AUTO: 27.5 % (ref 19.6–45.3)
MCH RBC QN AUTO: 30.1 PG (ref 26.9–32)
MCHC RBC AUTO-ENTMCNC: 33.2 G/DL (ref 32.4–36.3)
MCV RBC AUTO: 90.6 FL (ref 80.5–98.2)
MONOCYTES # BLD AUTO: 0.47 10*3/MM3 (ref 0.2–1.2)
MONOCYTES NFR BLD AUTO: 8.3 % (ref 5–12)
NEUTROPHILS # BLD AUTO: 3.47 10*3/MM3 (ref 1.9–8.1)
NEUTROPHILS NFR BLD AUTO: 61.7 % (ref 42.7–76)
PLATELET # BLD AUTO: 242 10*3/MM3 (ref 140–500)
POTASSIUM SERPL-SCNC: 4.9 MMOL/L (ref 3.5–5.2)
PROT SERPL-MCNC: 7.7 G/DL (ref 6–8.5)
R RICKETTSI IGG SER QL IA: NEGATIVE
R RICKETTSI IGM SER-ACNC: 0.78 INDEX (ref 0–0.89)
RBC # BLD AUTO: 4.49 10*6/MM3 (ref 3.9–5.2)
SERVICE CMNT-IMP: ABNORMAL
SODIUM SERPL-SCNC: 141 MMOL/L (ref 136–145)
T3FREE SERPL-MCNC: 3.5 PG/ML (ref 2–4.4)
T4 FREE SERPL-MCNC: 1.54 NG/DL (ref 0.93–1.7)
TRIGL SERPL-MCNC: 146 MG/DL (ref 0–150)
TSH SERPL DL<=0.005 MIU/L-ACNC: 1.48 MIU/ML (ref 0.27–4.2)
VIT B12 SERPL-MCNC: 498 PG/ML (ref 211–946)
VLDLC SERPL CALC-MCNC: 29.2 MG/DL (ref 5–40)
WBC # BLD AUTO: 5.63 10*3/MM3 (ref 4.5–10.7)

## 2018-09-20 ENCOUNTER — OFFICE VISIT (OUTPATIENT)
Dept: FAMILY MEDICINE CLINIC | Facility: CLINIC | Age: 43
End: 2018-09-20

## 2018-09-20 VITALS
HEIGHT: 68 IN | WEIGHT: 186 LBS | SYSTOLIC BLOOD PRESSURE: 135 MMHG | HEART RATE: 75 BPM | OXYGEN SATURATION: 98 % | BODY MASS INDEX: 28.19 KG/M2 | DIASTOLIC BLOOD PRESSURE: 90 MMHG | RESPIRATION RATE: 16 BRPM | TEMPERATURE: 98.2 F

## 2018-09-20 DIAGNOSIS — F41.9 ANXIETY: Primary | ICD-10-CM

## 2018-09-20 PROCEDURE — 99213 OFFICE O/P EST LOW 20 MIN: CPT | Performed by: NURSE PRACTITIONER

## 2018-09-20 RX ORDER — BUSPIRONE HYDROCHLORIDE 15 MG/1
TABLET ORAL
Qty: 30 TABLET | Refills: 5 | Status: SHIPPED | OUTPATIENT
Start: 2018-09-20 | End: 2020-04-24

## 2018-09-20 NOTE — PROGRESS NOTES
Subjective   More Payne is a 43 y.o. female.     History of Present Illness   More Payne female 43 y.o. who presents today for new evaluation and treatment of Anxiety and Panic Attacks.  She reports anxiety, impaired concentration, unable to relax and panic feeling. Onset of symptoms was approximately a few months ago.  She denies current suicidal and homicidal ideation. Risk factors are lifestyle of multiple roles, family situation/stress and prior diagnosis of anxiety.  Previous treatment includes ativan.  She complains of the following medication side effects: none.  The patient has previously been in counseling. Sees Dr. Vernon but cannot get appt for 3 months.  Has been taking leftover ativan but requests new RX.      The following portions of the patient's history were reviewed and updated as appropriate: allergies, current medications, past family history, past medical history, past social history, past surgical history and problem list.    Review of Systems   Constitutional: Negative for unexpected weight change.   Respiratory: Negative for shortness of breath.    Cardiovascular: Negative for chest pain and palpitations.   Psychiatric/Behavioral: Positive for agitation, decreased concentration and sleep disturbance. Negative for behavioral problems, dysphoric mood, self-injury and suicidal ideas. The patient is nervous/anxious.        Objective   Physical Exam   Constitutional: She is oriented to person, place, and time. She appears well-developed and well-nourished.   Pulmonary/Chest: Effort normal.   Neurological: She is alert and oriented to person, place, and time.   Psychiatric: Judgment and thought content normal. Her mood appears anxious. Her speech is rapid and/or pressured. She is agitated. Cognition and memory are normal.   Nursing note and vitals reviewed.      Assessment/Plan   More was seen today for anxiety.    Diagnoses and all orders for this visit:    Anxiety  -      busPIRone (BUSPAR) 15 MG tablet; Take 1/2 to 1 tablet tid as needed for anxiety        Discussed that ativan would not be filled and she will have to f/u with psychiatry for this.

## 2020-03-10 DIAGNOSIS — Z00.00 ROUTINE GENERAL MEDICAL EXAMINATION AT A HEALTH CARE FACILITY: ICD-10-CM

## 2020-03-10 DIAGNOSIS — E55.9 VITAMIN D DEFICIENCY: Primary | ICD-10-CM

## 2020-04-24 ENCOUNTER — TELEMEDICINE (OUTPATIENT)
Dept: FAMILY MEDICINE CLINIC | Facility: CLINIC | Age: 45
End: 2020-04-24

## 2020-04-24 DIAGNOSIS — J06.9 ACUTE URI: ICD-10-CM

## 2020-04-24 DIAGNOSIS — Z20.822 SUSPECTED COVID-19 VIRUS INFECTION: Primary | ICD-10-CM

## 2020-04-24 DIAGNOSIS — IMO0001 LUNG NODULE < 6CM ON CT: ICD-10-CM

## 2020-04-24 PROCEDURE — 99213 OFFICE O/P EST LOW 20 MIN: CPT | Performed by: PHYSICIAN ASSISTANT

## 2020-04-24 RX ORDER — BENZONATATE 200 MG/1
200 CAPSULE ORAL 3 TIMES DAILY PRN
Qty: 30 CAPSULE | Refills: 0 | Status: SHIPPED | OUTPATIENT
Start: 2020-04-24 | End: 2020-07-13

## 2020-04-24 RX ORDER — LEVOFLOXACIN 500 MG/1
TABLET, FILM COATED ORAL
Qty: 10 TABLET | Refills: 0 | Status: SHIPPED | OUTPATIENT
Start: 2020-04-24 | End: 2020-07-13

## 2020-04-24 NOTE — PROGRESS NOTES
Subjective   More Payne is a 44 y.o. female.   You have chosen to receive care through a telehealth visit.  Do you consent to use a video/audio connection for your medical care today? Yes  With face time with patient permission  History of Present Illness   More Payne 44 y.o. female who presents for evaluation of upper respiratory congestion, fever, fatigue. She woke up with fever today.  Son is at home from college and has mono (neg COVID 19). She is having URI C/os. H/a, some SOA, cough, sneeze, sore throat, right ear pain, body aches, fatigue.   Onset of symptoms was 4 days ago, gradually worsening since that time. Patient denies wheezing, dysgeusia. No anosmia yet.   Evaluation to date: none Treatment to date:  none.     Notes of return to work  ?When a non-test-based strategy is used, patients may discontinue home isolation when the following criteria are met:  •At least seven days have passed since symptoms first appeared AND  •At least three days (72 hours) have passed since recovery of symptoms (defined as resolution of fever without the use of fever-reducing medications and improvement in respiratory symptoms [eg, cough, shortness of breath])    She woke up with fever today.  Son is at home from college and has mono (neg COVID 19). She is having URI C/os. H/a, some SOA, cough, sneeze, sore throat, right ear pain, body aches, fatigue    Her weight is down and working ---25,000 steps a day---new job since Sept. She has lost 32 lbs.  Eating less also. This has stabilized     She went to [facility] 14, 2020 for syncopal episode  Was noted to have bradycardia on her EKG and was suggested she see cardiology which I do agree with  I do want her to see cardiology  CTA of the chest showed negative pulmonary embolism but showed a left lung nodule that needs to be followed up on a year.  This was done due to the d-dimer was elevated  IMPRESSION:   1. Limited study secondary to motion. No  pulmonary emboli are identified. The segmental and proximal subsegmental lower lobe pulmonary arteries are obscured.  2. No acute intrathoracic abnormality.  3. Incidental solid pulmonary nodule measuring 5 mm in size. If the patient is at low risk for lung cancer then no routine follow-up will be necessary. If the patient is at high risk for lung cancer, comparison with any prior studies is recommended. If   none are available, then optional CT at 12 months is recommended. This is the current recommenda  tion as per the Fleischner Society 2017 guidelines for management of incidentally detected pulmonary nodules in adults.     Dictated by: Eliud Gibson M.D.    NO more episodes of syncope and the February episode was a single 1.  The following portions of the patient's history were reviewed and updated as appropriate: allergies, current medications, past family history, past medical history, past social history, past surgical history and problem list.    Review of Systems   Constitutional: Positive for activity change, fatigue and fever. Negative for appetite change and unexpected weight change.   HENT: Positive for congestion, ear pain, postnasal drip, rhinorrhea, sinus pressure, sneezing and sore throat. Negative for nosebleeds and trouble swallowing.    Eyes: Negative for pain and visual disturbance.   Respiratory: Positive for cough and shortness of breath. Negative for chest tightness and wheezing.    Cardiovascular: Negative for chest pain and palpitations.   Gastrointestinal: Negative for abdominal pain and blood in stool.   Endocrine: Negative.    Genitourinary: Negative for difficulty urinating and hematuria.   Musculoskeletal: Positive for myalgias. Negative for joint swelling.   Skin: Negative for color change and rash.   Allergic/Immunologic: Negative.    Neurological: Negative for syncope and speech difficulty.   Hematological: Negative for adenopathy.   Psychiatric/Behavioral: Negative for agitation  and confusion.   All other systems reviewed and are negative.      Objective   Physical Exam   Constitutional: She is oriented to person, place, and time. She appears well-developed and well-nourished. No distress.   HENT:   Head: Normocephalic and atraumatic.   Right Ear: External ear normal.   Left Ear: External ear normal.   Eyes: Conjunctivae are normal. Right eye exhibits no discharge. Left eye exhibits no discharge. No scleral icterus.   Neck: Normal range of motion.   Pulmonary/Chest: Effort normal. No stridor. No respiratory distress.   Abdominal: Soft. There is no guarding.   Musculoskeletal: Normal range of motion.   Neurological: She is alert and oriented to person, place, and time. Coordination normal.   Skin: Skin is dry. She is not diaphoretic.   Psychiatric: She has a normal mood and affect. Her behavior is normal. Judgment and thought content normal.       Assessment/Plan   More was seen today for cough.    Diagnoses and all orders for this visit:    Suspected Covid-19 Virus Infection    Acute URI    Lung nodule < 6cm on CT  -     CT Chest Without Contrast; Future    Other orders  -     benzonatate (TESSALON) 200 MG capsule; Take 1 capsule by mouth 3 (Three) Times a Day As Needed for Cough.  -     levoFLOXacin (LEVAQUIN) 500 MG tablet; One PO daily for infection      Off work until May 9; today if fist day missed work ---I highly suspect she has COVID 19; wear mask all times. Plan off 14 days  Avoid NSAIDs  APAP prn  Wear mask at all times  We will print information on quarantine--plan 14 days  CT chest due for that pulmonary nodule incidentally found at [facility] in February order made  Start Levaquin if secondary sinus infection and Tessalon Perles as needed for cough  Time spent was 30 minutes

## 2020-04-24 NOTE — PATIENT INSTRUCTIONS

## 2020-05-07 ENCOUNTER — TELEMEDICINE (OUTPATIENT)
Dept: FAMILY MEDICINE CLINIC | Facility: CLINIC | Age: 45
End: 2020-05-07

## 2020-05-07 DIAGNOSIS — Z20.822 EXPOSURE TO COVID-19 VIRUS: Primary | ICD-10-CM

## 2020-05-07 PROCEDURE — 99212 OFFICE O/P EST SF 10 MIN: CPT | Performed by: PHYSICIAN ASSISTANT

## 2020-05-07 NOTE — PROGRESS NOTES
Subjective   More Payne is a 44 y.o. female.   You have chosen to receive care through a telehealth visit.  Do you consent to use a video/audio connection for your medical care today? Yes  facetime  History of Present Illness       Spouse dev fever last thurs; Covid 19 test pending---concern now that he may have it and her work knows about this and her recent symptoms; she will need COVID 19 testing to prove negative.    I did video visit with her; this was 4-24-20  I suspected she has COVID 19; see nots from 4-24-20  Still mild congestion; no fever, no SOA ----for last 3 days.  Work said She needs to have negative COVID test to return to work.  Scheduled to have test Sat.   She will need to remain off work until results of test.  She will have to quarantine while waiting.  She will need to send me results COVID 19; and release her to work;     Exposure with spouse?? thurs onset; results pending; test is Sat  The following portions of the patient's history were reviewed and updated as appropriate: allergies, current medications, past family history, past medical history, past social history, past surgical history and problem list.    Review of Systems   Constitutional: Negative for activity change, appetite change, fatigue, fever and unexpected weight change.   HENT: Negative for nosebleeds and trouble swallowing.    Eyes: Negative for pain and visual disturbance.   Respiratory: Negative for chest tightness, shortness of breath and wheezing.    Cardiovascular: Negative for chest pain and palpitations.   Gastrointestinal: Negative for abdominal pain and blood in stool.   Endocrine: Negative.    Genitourinary: Negative for difficulty urinating and hematuria.   Musculoskeletal: Negative for joint swelling.   Skin: Negative for color change and rash.   Allergic/Immunologic: Negative.    Neurological: Negative for syncope and speech difficulty.   Hematological: Negative for adenopathy.   Psychiatric/Behavioral:  Negative for agitation and confusion.   All other systems reviewed and are negative.      Objective   Physical Exam   Constitutional: She is oriented to person, place, and time. She appears well-developed and well-nourished. No distress.   HENT:   Head: Normocephalic and atraumatic.   Right Ear: External ear normal.   Left Ear: External ear normal.   Eyes: Conjunctivae are normal. Right eye exhibits no discharge. Left eye exhibits no discharge. No scleral icterus.   Neck: Normal range of motion.   Pulmonary/Chest: Effort normal. No stridor. No respiratory distress.   Abdominal: Soft. There is no guarding.   Musculoskeletal: Normal range of motion.   Neurological: She is alert and oriented to person, place, and time. Coordination normal.   Skin: Skin is dry. She is not diaphoretic.   Psychiatric: She has a normal mood and affect. Her behavior is normal. Judgment and thought content normal.       Assessment/Plan   More was seen today for fatigue.    Diagnoses and all orders for this visit:    Exposure to Covid-19 Virus    time spent 15 min    Will have to cont. Off work until results of COVID 19 and cannot take test until 5-9-20 and may take several days to get back. Must have neg test to return to work. (spouse results from thurs pending).  Will need note

## 2020-07-13 ENCOUNTER — TRANSCRIBE ORDERS (OUTPATIENT)
Dept: ADMINISTRATIVE | Facility: HOSPITAL | Age: 45
End: 2020-07-13

## 2020-07-13 ENCOUNTER — TELEPHONE (OUTPATIENT)
Dept: FAMILY MEDICINE CLINIC | Facility: CLINIC | Age: 45
End: 2020-07-13

## 2020-07-13 ENCOUNTER — OFFICE VISIT (OUTPATIENT)
Dept: FAMILY MEDICINE CLINIC | Facility: CLINIC | Age: 45
End: 2020-07-13

## 2020-07-13 ENCOUNTER — HOSPITAL ENCOUNTER (OUTPATIENT)
Dept: GENERAL RADIOLOGY | Facility: HOSPITAL | Age: 45
Discharge: HOME | End: 2020-07-13

## 2020-07-13 ENCOUNTER — HOSPITAL ENCOUNTER (OUTPATIENT)
Dept: GENERAL RADIOLOGY | Facility: HOSPITAL | Age: 45
Discharge: HOME | End: 2020-07-13
Admitting: PHYSICIAN ASSISTANT

## 2020-07-13 VITALS
HEART RATE: 74 BPM | RESPIRATION RATE: 16 BRPM | DIASTOLIC BLOOD PRESSURE: 70 MMHG | HEIGHT: 68 IN | WEIGHT: 176 LBS | OXYGEN SATURATION: 99 % | BODY MASS INDEX: 26.67 KG/M2 | TEMPERATURE: 98.2 F | SYSTOLIC BLOOD PRESSURE: 120 MMHG

## 2020-07-13 DIAGNOSIS — M54.50 ACUTE MIDLINE LOW BACK PAIN WITHOUT SCIATICA: ICD-10-CM

## 2020-07-13 DIAGNOSIS — M25.552 ACUTE HIP PAIN, BILATERAL: ICD-10-CM

## 2020-07-13 DIAGNOSIS — M25.551 ACUTE HIP PAIN, BILATERAL: ICD-10-CM

## 2020-07-13 DIAGNOSIS — B07.8 OTHER VIRAL WARTS: ICD-10-CM

## 2020-07-13 DIAGNOSIS — M79.7 FIBROMYALGIA: ICD-10-CM

## 2020-07-13 DIAGNOSIS — M25.551 ACUTE HIP PAIN, BILATERAL: Primary | ICD-10-CM

## 2020-07-13 DIAGNOSIS — M25.559 HIP PAIN: Primary | ICD-10-CM

## 2020-07-13 DIAGNOSIS — M25.552 ACUTE HIP PAIN, BILATERAL: Primary | ICD-10-CM

## 2020-07-13 PROCEDURE — 72110 X-RAY EXAM L-2 SPINE 4/>VWS: CPT

## 2020-07-13 PROCEDURE — 73521 X-RAY EXAM HIPS BI 2 VIEWS: CPT

## 2020-07-13 PROCEDURE — 99213 OFFICE O/P EST LOW 20 MIN: CPT | Performed by: PHYSICIAN ASSISTANT

## 2020-07-13 NOTE — TELEPHONE ENCOUNTER
Scheduling called saying they cant  schedule the( xr bilateral with or without) pelvis pt today because of the order is having an pop up referring to questions on the order. Can you put this order in again please.

## 2020-07-13 NOTE — PROGRESS NOTES
"Subjective   More Payne is a 45 y.o. female.     History of Present Illness   More Payne 45 y.o. female /70 (BP Location: Left arm, Patient Position: Sitting, Cuff Size: Adult)   Pulse 74   Temp 98.2 °F (36.8 °C) (Skin)   Resp 16   Ht 172.7 cm (68\")   Wt 79.8 kg (176 lb)   SpO2 99%   BMI 26.76 kg/m²  who presents today for hip pain and wart right finger  she has a history of   Patient Active Problem List   Diagnosis   • Anxiety   • Bipolar disorder (CMS/HCC)   • Dysphagia   • Fibromyalgia   • Migraine   • Tension headache   • Thyroid disorder   • Essential hypertension   • Low back pain   .      Having hip pain and worse since had COVID 19; sore ROM; sore to lay on; no motor or sensory loss, no burn; no incontinence ;  Also having LBP  No prior lumbar or hip surgery---has fibro  Stiffness in hips   Hurts to lift legs at end of day.  Wart right medial middle finger---no help OTC===see derm  The following portions of the patient's history were reviewed and updated as appropriate: allergies, current medications, past family history, past medical history, past social history, past surgical history and problem list.    Review of Systems   Constitutional: Positive for fatigue. Negative for activity change, appetite change and unexpected weight change.   HENT: Negative for nosebleeds and trouble swallowing.    Eyes: Negative for pain and visual disturbance.   Respiratory: Negative for chest tightness, shortness of breath and wheezing.    Cardiovascular: Negative for chest pain and palpitations.   Gastrointestinal: Negative for abdominal pain and blood in stool.   Endocrine: Negative.    Genitourinary: Negative for difficulty urinating and hematuria.   Musculoskeletal: Positive for arthralgias, back pain and myalgias. Negative for joint swelling.   Skin: Negative for color change and rash.   Allergic/Immunologic: Negative.    Neurological: Negative for syncope and speech difficulty.   Hematological: " Negative for adenopathy.   Psychiatric/Behavioral: Negative for agitation and confusion.   All other systems reviewed and are negative.      Objective   Physical Exam   Constitutional: She is oriented to person, place, and time. She appears well-developed and well-nourished. No distress.   HENT:   Head: Normocephalic.   Eyes: Pupils are equal, round, and reactive to light. Conjunctivae and EOM are normal.   Neck: Normal range of motion. Neck supple.   Cardiovascular: Normal rate, regular rhythm and normal heart sounds.   No murmur heard.  Pulmonary/Chest: Effort normal and breath sounds normal.   Musculoskeletal: She exhibits tenderness. She exhibits no deformity.   Sore in hips bilat; reduced ROM d/t pain bilat   Neurological: She is alert and oriented to person, place, and time. She displays normal reflexes. No sensory deficit. She exhibits normal muscle tone. Coordination normal.   Skin: Skin is warm and dry. No rash noted. She is not diaphoretic.   Medial right distal phalanx wart growth--refer to derm   Psychiatric: Her behavior is normal. Judgment and thought content normal. Her affect is not inappropriate. She is not actively hallucinating. Cognition and memory are normal.   Nursing note and vitals reviewed.      Assessment/Plan   More was seen today for hip pain.    Diagnoses and all orders for this visit:    Acute hip pain, bilateral  -     XR Spine Lumbar Complete 4+VW; Future  -     XR Hips Bilateral With or Without Pelvis 3-4 View; Future  -     Ambulatory Referral to Orthopedic Surgery    Acute midline low back pain without sciatica  -     XR Spine Lumbar Complete 4+VW; Future  -     XR Hips Bilateral With or Without Pelvis 3-4 View; Future  -     Ambulatory Referral to Orthopedic Surgery    Other viral warts  -     Ambulatory Referral to Dermatology    Fibromyalgia        Need Xrays hips bilat, lumbar xray  Refer ortho  Refer derm

## 2020-07-21 ENCOUNTER — TELEPHONE (OUTPATIENT)
Dept: FAMILY MEDICINE CLINIC | Facility: CLINIC | Age: 45
End: 2020-07-21

## 2020-07-21 NOTE — TELEPHONE ENCOUNTER
Pt called stating that you were referring her to Dr. Capellan.  Pt states that she is getting a call from a Dr. Robles's office.  Pt asking if this is a mistake or if you changed your mind.  Please advise.

## 2020-10-27 ENCOUNTER — TELEMEDICINE (OUTPATIENT)
Dept: FAMILY MEDICINE CLINIC | Facility: CLINIC | Age: 45
End: 2020-10-27

## 2020-10-27 DIAGNOSIS — H66.91 RIGHT OTITIS MEDIA, UNSPECIFIED OTITIS MEDIA TYPE: ICD-10-CM

## 2020-10-27 DIAGNOSIS — J06.9 ACUTE URI: Primary | ICD-10-CM

## 2020-10-27 PROCEDURE — 99213 OFFICE O/P EST LOW 20 MIN: CPT | Performed by: PHYSICIAN ASSISTANT

## 2020-10-27 RX ORDER — LEVOFLOXACIN 500 MG/1
TABLET, FILM COATED ORAL
Qty: 10 TABLET | Refills: 0 | Status: SHIPPED | OUTPATIENT
Start: 2020-10-27 | End: 2020-11-11

## 2020-10-27 NOTE — PROGRESS NOTES
Subjective   More Payne is a 45 y.o. female.   You have chosen to receive care through a telehealth visit.  Do you consent to use a video/audio connection for your medical care today? Yes  History of Present Illness   More Payne 45 y.o. female who presents for evaluation of sinus pressure and congestion, fever, sore throat, cough, ear pressure, fatigue. Symptoms include ear pressure, congestion, post nasal drip, fever, ear pain and fatigue.  Onset of symptoms was 1 day ago, rapidly worsening since that time. Patient denies shortness of breath, wheezing.   Evaluation to date: none Treatment to date:  Tylenol.   Advil Cold and Sinus  Saw patient recently for positive COVID-19 and this has resolved  Right ear pain and fever    The following portions of the patient's history were reviewed and updated as appropriate: allergies, current medications, past family history, past medical history, past social history, past surgical history and problem list.    Review of Systems   Constitutional: Positive for activity change, appetite change, fatigue and fever. Negative for unexpected weight change.   HENT: Positive for congestion, ear pain, postnasal drip, sinus pressure, sinus pain and sore throat. Negative for ear discharge, nosebleeds and trouble swallowing.    Eyes: Negative for pain and visual disturbance.   Respiratory: Positive for cough. Negative for chest tightness, shortness of breath and wheezing.    Cardiovascular: Negative for chest pain and palpitations.   Gastrointestinal: Negative for abdominal pain and blood in stool.   Endocrine: Negative.    Genitourinary: Negative for difficulty urinating and hematuria.   Musculoskeletal: Negative for joint swelling and myalgias.   Skin: Negative for color change and rash.   Allergic/Immunologic: Negative.    Neurological: Negative for syncope and speech difficulty.   Hematological: Negative for adenopathy.   Psychiatric/Behavioral: Negative for agitation and  confusion.   All other systems reviewed and are negative.      Objective   Physical Exam  Constitutional:       General: She is not in acute distress.     Appearance: She is well-developed. She is not diaphoretic.   HENT:      Head: Normocephalic and atraumatic.      Right Ear: External ear normal.      Left Ear: External ear normal.   Eyes:      General: No scleral icterus.     Conjunctiva/sclera: Conjunctivae normal.   Neck:      Musculoskeletal: Normal range of motion.   Pulmonary:      Effort: Pulmonary effort is normal. No respiratory distress.   Musculoskeletal: Normal range of motion.   Skin:     General: Skin is dry.      Coloration: Skin is not jaundiced.   Neurological:      Mental Status: She is alert and oriented to person, place, and time.      Coordination: Coordination normal.   Psychiatric:         Mood and Affect: Mood normal.         Behavior: Behavior normal.         Thought Content: Thought content normal.         Judgment: Judgment normal.         Assessment/Plan   Diagnoses and all orders for this visit:    1. Acute URI (Primary)    2. Right otitis media, unspecified otitis media type    Other orders  -     levoFLOXacin (LEVAQUIN) 500 MG tablet; One PO daily for infection  Dispense: 10 tablet; Refill: 0      She does best on Levaquin  Start Rx  Stop antibiotic if tendon pain develops.  This medication can cause tendon rupture, though rare.    Time spent was 9 minutes

## 2020-10-27 NOTE — PATIENT INSTRUCTIONS

## 2020-11-11 ENCOUNTER — LAB (OUTPATIENT)
Dept: LAB | Facility: HOSPITAL | Age: 45
End: 2020-11-11

## 2020-11-11 ENCOUNTER — HOSPITAL ENCOUNTER (OUTPATIENT)
Dept: GENERAL RADIOLOGY | Facility: HOSPITAL | Age: 45
Discharge: HOME | End: 2020-11-11

## 2020-11-11 ENCOUNTER — TELEMEDICINE (OUTPATIENT)
Dept: FAMILY MEDICINE CLINIC | Facility: CLINIC | Age: 45
End: 2020-11-11

## 2020-11-11 DIAGNOSIS — R05.9 COUGH: ICD-10-CM

## 2020-11-11 DIAGNOSIS — R50.9 FEVER, UNSPECIFIED FEVER CAUSE: ICD-10-CM

## 2020-11-11 DIAGNOSIS — R52 BODY ACHES: ICD-10-CM

## 2020-11-11 DIAGNOSIS — Z20.822 EXPOSURE TO COVID-19 VIRUS: ICD-10-CM

## 2020-11-11 DIAGNOSIS — R05.9 COUGH: Primary | ICD-10-CM

## 2020-11-11 LAB
ALBUMIN SERPL-MCNC: 4.7 G/DL (ref 3.5–5.2)
ALBUMIN/GLOB SERPL: 1.9 G/DL
ALP SERPL-CCNC: 54 U/L (ref 39–117)
ALT SERPL W P-5'-P-CCNC: 29 U/L (ref 1–33)
ANION GAP SERPL CALCULATED.3IONS-SCNC: 11 MMOL/L (ref 5–15)
AST SERPL-CCNC: 26 U/L (ref 1–32)
BASOPHILS # BLD AUTO: 0.03 10*3/MM3 (ref 0–0.2)
BASOPHILS NFR BLD AUTO: 0.4 % (ref 0–1.5)
BILIRUB SERPL-MCNC: 0.2 MG/DL (ref 0–1.2)
BUN SERPL-MCNC: 12 MG/DL (ref 6–20)
BUN/CREAT SERPL: 17.9 (ref 7–25)
CALCIUM SPEC-SCNC: 9.3 MG/DL (ref 8.6–10.5)
CHLORIDE SERPL-SCNC: 99 MMOL/L (ref 98–107)
CO2 SERPL-SCNC: 27 MMOL/L (ref 22–29)
CREAT SERPL-MCNC: 0.67 MG/DL (ref 0.57–1)
DEPRECATED RDW RBC AUTO: 45.3 FL (ref 37–54)
EOSINOPHIL # BLD AUTO: 0.09 10*3/MM3 (ref 0–0.4)
EOSINOPHIL NFR BLD AUTO: 1.1 % (ref 0.3–6.2)
ERYTHROCYTE [DISTWIDTH] IN BLOOD BY AUTOMATED COUNT: 14.1 % (ref 12.3–15.4)
GFR SERPL CREATININE-BSD FRML MDRD: 96 ML/MIN/1.73
GLOBULIN UR ELPH-MCNC: 2.5 GM/DL
GLUCOSE SERPL-MCNC: 86 MG/DL (ref 65–99)
HCT VFR BLD AUTO: 43.5 % (ref 34–46.6)
HGB BLD-MCNC: 14.2 G/DL (ref 12–15.9)
IMM GRANULOCYTES # BLD AUTO: 0.06 10*3/MM3 (ref 0–0.05)
IMM GRANULOCYTES NFR BLD AUTO: 0.7 % (ref 0–0.5)
LYMPHOCYTES # BLD AUTO: 2.33 10*3/MM3 (ref 0.7–3.1)
LYMPHOCYTES NFR BLD AUTO: 28.6 % (ref 19.6–45.3)
MCH RBC QN AUTO: 29 PG (ref 26.6–33)
MCHC RBC AUTO-ENTMCNC: 32.6 G/DL (ref 31.5–35.7)
MCV RBC AUTO: 89 FL (ref 79–97)
MONOCYTES # BLD AUTO: 0.66 10*3/MM3 (ref 0.1–0.9)
MONOCYTES NFR BLD AUTO: 8.1 % (ref 5–12)
NEUTROPHILS NFR BLD AUTO: 4.98 10*3/MM3 (ref 1.7–7)
NEUTROPHILS NFR BLD AUTO: 61.1 % (ref 42.7–76)
NRBC BLD AUTO-RTO: 0 /100 WBC (ref 0–0.2)
PLATELET # BLD AUTO: 263 10*3/MM3 (ref 140–450)
PMV BLD AUTO: 10.6 FL (ref 6–12)
POTASSIUM SERPL-SCNC: 4.2 MMOL/L (ref 3.5–5.2)
PROT SERPL-MCNC: 7.2 G/DL (ref 6–8.5)
RBC # BLD AUTO: 4.89 10*6/MM3 (ref 3.77–5.28)
SODIUM SERPL-SCNC: 137 MMOL/L (ref 136–145)
WBC # BLD AUTO: 8.15 10*3/MM3 (ref 3.4–10.8)

## 2020-11-11 PROCEDURE — 36415 COLL VENOUS BLD VENIPUNCTURE: CPT

## 2020-11-11 PROCEDURE — 99213 OFFICE O/P EST LOW 20 MIN: CPT | Performed by: PHYSICIAN ASSISTANT

## 2020-11-11 PROCEDURE — 85025 COMPLETE CBC W/AUTO DIFF WBC: CPT

## 2020-11-11 PROCEDURE — 71046 X-RAY EXAM CHEST 2 VIEWS: CPT

## 2020-11-11 PROCEDURE — 80053 COMPREHEN METABOLIC PANEL: CPT

## 2020-11-11 NOTE — PATIENT INSTRUCTIONS
Infection Prevention in the Home  If you have an infection, may have been exposed to an infection, or are taking care of someone who has an infection, it is important to know how to keep the infection from spreading. Follow your health care provider's instructions and use these guidelines to help stop the spread of infection.  How infections are spread  In order for an infection to spread, the following must be present:  · A germ. This may be a virus, bacteria, fungus, or parasite.  · A place for the germ to live. This may be:  ? On or in a person, animal, plant, or food.  ? In soil or water.  ? On surfaces, such as a door handle.  · A person or animal who can develop a disease if the germ enters the body (host). The host does not have resistance to the germ.  · A way for the germ to enter the host. This may occur by:  ? Direct contact with an infected person or animal. This can happen through shaking hands or hugging. Some germs can also travel through the air and spread to others. This can happen when an infected person coughs or sneezes on or near other people.  ? Indirect contact. This occurs when the germ enters the host through contact with an infected object. Examples include:  § Eating or drinking food or water that has the germ (is contaminated).  § Touching a contaminated surface with your hands, and then touching your face, eyes, nose, or mouth.  Supplies needed:  · Soap.  · Alcohol-based hand .  · Standard cleaning products.  · Disinfectants, such as bleach.  · Reusable cleaning cloths, sponges, or paper towels.  · Disposable or reusable utility gloves.  How to prevent infection from spreading  There are several things that you can do to help prevent infection from spreading.  Take these general actions  Everyone should take the following actions to prevent the spread of infection:  · Wash your hands often with soap and water for at least 20 seconds. If soap and water are not available, use  alcohol-based hand .  · Avoid touching your face, mouth, nose, or eyes.  · Cough or sneeze into a tissue, sleeve, or elbow instead of into your hand or into the air.  ? If you cough or sneeze into a tissue, throw it away immediately and wash your hands.    Keep your bathroom clean  · Provide soap.  · Change towels and washcloths frequently.  · Change toothbrushes often and store them separately in a clean, dry place.  · Clean and disinfect all surfaces, including the toilet, floor, tub, shower, and sink.  · Do not share personal items, such as razors, toothbrushes, deodorant, severino, brushes, towels, and washcloths.  Maintain hygiene in the kitchen    · Wash your hands before and after preparing food and before you eat.  · Clean the inside of your refrigerator each week.  · Keep your refrigerator set at 40°F (4°C) or less, and set your freezer at 0°F (-18°C) or less.  · Keep work surfaces clean. Disinfect them regularly.  · Wash your dishes in hot, soapy water. Air-dry your dishes or use a .  · Do not share dishes or eating utensils.  Handle food safely  · Store food carefully.  · Refrigerate leftovers promptly in covered containers.  · Throw out stale or spoiled food.  · Thaw foods in the refrigerator or microwave, not at room temperature.  · Serve foods at the proper temperature. Do not eat raw meat. Make sure it is cooked to the appropriate temperature. Cook eggs until they are firm.  · Wash fruits and vegetables under running water.  · Use separate cutting boards, plates, and utensils for raw foods and cooked foods.  · Use a clean spoon each time you sample food while cooking.  Do laundry the right way  · Wear gloves if laundry is visibly soiled.  · Do not shake soiled laundry. Doing that may send germs into the air.  · Wash laundry in hot water.  · If you cannot wash the laundry right away, place it in a plastic bag and wash it as soon as possible.  Be careful around animals and pets  · Wash  your hands before and after touching animals.  · If you have a pet, ensure that your pet stays clean. Do not let people with weak immune systems touch bird droppings, fish tank water, or a litter box.  ? If you have a pet cage or litter box, be sure to clean it every day.  · If you are sick, stay away from animals and have someone else care for them if possible.  How to clean and disinfect objects and surfaces  Precautions  · Some disinfectants work for certain germs and not others. Read the 's instructions or read online resources to determine if the product you are using will work for the germ you are trying to remove.  · If you choose to use bleach, use it safely. Never mix it with other cleaning products, especially those that contain ammonia. This mixture can create a dangerous gas that may be deadly.  · Keep proper movement of fresh air in your home (ventilation).  · Pour used mop water down the utility sink or toilet. Do not pour this water down the kitchen sink.  Objects and surfaces    · If surfaces are visibly soiled, clean them first with soap and water before disinfecting.  · Disinfect surfaces that are frequently touched every day. This may include:  ? Counters.  ? Tables.  ? Doorknobs.  ? Sinks and faucets.  ? Electronics, such as:  § Phones.  § Remote controls.  § Keyboards.  § Computers and tablets.  Cleaning supplies  Some cleaning supplies can breed germs. Take good care of them to prevent germs from spreading. To do this:  · Soak toilet brushes, mops, and sponges in bleach and water for 5 minutes after each use, or according to 's instructions.  · Wash reusable cleaning cloths and sanitize sponges after each use.  · Throw away disposable gloves after one use.  · Replace reusable utility gloves if they are cracked or torn or if they start to peel.  Additional actions if you are sick  If you live with other people:    · Avoid close contact with those around you. Stay at least  3 ft (1 m) away from others, if possible.  · Use a separate bathroom, if possible.  · If possible, sleep in a separate bedroom or in a separate bed to prevent infecting other household members.  ? Change bedroom linens each week or whenever they are soiled.  · Have everyone in the household wash hands often with soap and water. If soap and water are not available, use alcohol-based hand .  In general:  · Stay home except to get medical care. Call ahead before visiting your health care provider.  · Ask others to get groceries and household supplies and to refill prescriptions for you.  · Avoid public areas. Try not to take public transportation.  · If you can, wear a mask if you need to go out of the house, or if you are in close contact with someone who is not sick.  · Avoid visitors until you have completely recovered, or until you have no signs and symptoms of infection.  · Avoid preparing food or providing care for others. If you must prepare food or provide care for others, wear a mask and wash your hands before and after doing these things.  Where to find more information  · Centers for Disease Control and Prevention: www.cdc.gov/nonpharmaceutical-interventions/index.html  · World Health Organization (WHO): www.who.int/infection-prevention/about/en/  · Association for Professionals in Infection Control and Epidemiology: professionals.site.apic.org/settings-of-care/non-healthcare-setting/home/  Summary  · It is important to know how to keep the infection from spreading.  · Make sure everyone in your household washes their hands often with soap and water.  · Disinfect surfaces that are frequently touched every day.  · If you are sick, stay home except to get medical care.  This information is not intended to replace advice given to you by your health care provider. Make sure you discuss any questions you have with your health care provider.  Document Released: 09/26/2009 Document Revised: 04/14/2020  Document Reviewed: 03/13/2020  [employer] Patient Education © 2020 [employer]

## 2020-11-12 ENCOUNTER — TELEPHONE (OUTPATIENT)
Dept: FAMILY MEDICINE CLINIC | Facility: CLINIC | Age: 45
End: 2020-11-12

## 2020-11-18 ENCOUNTER — TELEPHONE (OUTPATIENT)
Dept: FAMILY MEDICINE CLINIC | Facility: CLINIC | Age: 45
End: 2020-11-18

## 2020-11-18 NOTE — TELEPHONE ENCOUNTER
Pt called stating she is still feeling bad after covid test coming back neg wants to know if you can extend her work note ?

## 2021-02-19 ENCOUNTER — APPOINTMENT (OUTPATIENT)
Dept: OTHER | Facility: HOSPITAL | Age: 46
End: 2021-02-19

## 2021-02-19 ENCOUNTER — HOSPITAL ENCOUNTER (OUTPATIENT)
Dept: CT IMAGING | Facility: HOSPITAL | Age: 46
Discharge: HOME | End: 2021-02-19

## 2021-02-19 DIAGNOSIS — Z09 FOLLOW UP: ICD-10-CM

## 2021-02-19 DIAGNOSIS — IMO0001 LUNG NODULE < 6CM ON CT: ICD-10-CM

## 2021-02-19 PROCEDURE — 71250 CT THORAX DX C-: CPT

## 2021-02-22 DIAGNOSIS — IMO0001 LUNG NODULE < 6CM ON CT: Primary | ICD-10-CM

## 2021-03-03 ENCOUNTER — OFFICE VISIT (OUTPATIENT)
Dept: FAMILY MEDICINE CLINIC | Facility: CLINIC | Age: 46
End: 2021-03-03

## 2021-03-03 VITALS
SYSTOLIC BLOOD PRESSURE: 135 MMHG | HEART RATE: 74 BPM | HEIGHT: 68 IN | RESPIRATION RATE: 16 BRPM | WEIGHT: 182 LBS | DIASTOLIC BLOOD PRESSURE: 80 MMHG | OXYGEN SATURATION: 99 % | BODY MASS INDEX: 27.58 KG/M2 | TEMPERATURE: 97.5 F

## 2021-03-03 DIAGNOSIS — I10 ESSENTIAL HYPERTENSION: Primary | ICD-10-CM

## 2021-03-03 DIAGNOSIS — IMO0001 LUNG NODULE < 6CM ON CT: ICD-10-CM

## 2021-03-03 DIAGNOSIS — E55.9 VITAMIN D DEFICIENCY: ICD-10-CM

## 2021-03-03 DIAGNOSIS — E78.2 HYPERLIPIDEMIA, MIXED: ICD-10-CM

## 2021-03-03 PROCEDURE — 99214 OFFICE O/P EST MOD 30 MIN: CPT | Performed by: PHYSICIAN ASSISTANT

## 2021-03-03 RX ORDER — OXYCODONE AND ACETAMINOPHEN 7.5; 325 MG/1; MG/1
1 TABLET ORAL EVERY 6 HOURS PRN
COMMUNITY
End: 2022-07-27

## 2021-03-03 RX ORDER — HYDROCHLOROTHIAZIDE 12.5 MG/1
12.5 CAPSULE, GELATIN COATED ORAL DAILY
COMMUNITY
Start: 2021-01-26 | End: 2022-07-18 | Stop reason: SDUPTHER

## 2021-03-03 RX ORDER — LAMOTRIGINE 100 MG/1
TABLET ORAL
COMMUNITY
Start: 2021-02-26 | End: 2021-11-02

## 2021-03-03 RX ORDER — LORAZEPAM 0.5 MG/1
0.5 TABLET ORAL AS NEEDED
COMMUNITY
Start: 2021-01-26

## 2021-03-03 RX ORDER — LIDOCAINE 36 MG/1
PATCH TOPICAL
COMMUNITY
Start: 2020-12-30 | End: 2022-11-08

## 2021-03-03 RX ORDER — ROSUVASTATIN CALCIUM 10 MG/1
10 TABLET, COATED ORAL DAILY
Qty: 90 TABLET | Refills: 1 | Status: SHIPPED | OUTPATIENT
Start: 2021-03-03 | End: 2021-11-02

## 2021-03-03 RX ORDER — BUPROPION HYDROCHLORIDE 150 MG/1
150 TABLET, EXTENDED RELEASE ORAL 2 TIMES DAILY
COMMUNITY
End: 2021-11-02

## 2021-03-03 RX ORDER — VALSARTAN 320 MG/1
320 TABLET ORAL DAILY
Qty: 90 TABLET | Refills: 1 | Status: SHIPPED | OUTPATIENT
Start: 2021-03-03 | End: 2022-07-18 | Stop reason: SDUPTHER

## 2021-03-03 NOTE — PROGRESS NOTES
"Subjective   More Payne is a 45 y.o. female.     History of Present Illness      Since the last visit, she has overall felt tired.  She has Essential Hypertension not at goal, plan to add/adjust medication, Hyperlipidemia and will start medication plan for treatment.  I will order follow up labs and Vitamin D deficiency and will update labs for continued management.  she has been compliant with current medications have reviewed them.  The patient denies medication side effects.  Will refill medications. /80 (BP Location: Left arm, Patient Position: Sitting, Cuff Size: Adult)   Pulse 74   Temp 97.5 °F (36.4 °C)   Resp 16   Ht 172.7 cm (67.99\")   Wt 82.6 kg (182 lb)   LMP 02/08/2021   SpO2 99%   BMI 27.68 kg/m²   Taking Valsartan 160mg and Hctz 12.5mg for HTN---cardio started HCTZ 3 weeks ago; out valsatan out since Friday  Lab Results   Component Value Date    CHLPL 258 (H) 05/23/2018    TRIG 146 05/23/2018    HDL 53 05/23/2018     (H) 05/23/2018       Results for orders placed or performed in visit on 11/11/20   Comprehensive Metabolic Panel    Specimen: Blood   Result Value Ref Range    Glucose 86 65 - 99 mg/dL    BUN 12 6 - 20 mg/dL    Creatinine 0.67 0.57 - 1.00 mg/dL    Sodium 137 136 - 145 mmol/L    Potassium 4.2 3.5 - 5.2 mmol/L    Chloride 99 98 - 107 mmol/L    CO2 27.0 22.0 - 29.0 mmol/L    Calcium 9.3 8.6 - 10.5 mg/dL    Total Protein 7.2 6.0 - 8.5 g/dL    Albumin 4.70 3.50 - 5.20 g/dL    ALT (SGPT) 29 1 - 33 U/L    AST (SGOT) 26 1 - 32 U/L    Alkaline Phosphatase 54 39 - 117 U/L    Total Bilirubin 0.2 0.0 - 1.2 mg/dL    eGFR Non African Amer 96 >60 mL/min/1.73    Globulin 2.5 gm/dL    A/G Ratio 1.9 g/dL    BUN/Creatinine Ratio 17.9 7.0 - 25.0    Anion Gap 11.0 5.0 - 15.0 mmol/L   CBC Auto Differential    Specimen: Blood   Result Value Ref Range    WBC 8.15 3.40 - 10.80 10*3/mm3    RBC 4.89 3.77 - 5.28 10*6/mm3    Hemoglobin 14.2 12.0 - 15.9 g/dL    Hematocrit 43.5 34.0 - 46.6 % "    MCV 89.0 79.0 - 97.0 fL    MCH 29.0 26.6 - 33.0 pg    MCHC 32.6 31.5 - 35.7 g/dL    RDW 14.1 12.3 - 15.4 %    RDW-SD 45.3 37.0 - 54.0 fl    MPV 10.6 6.0 - 12.0 fL    Platelets 263 140 - 450 10*3/mm3    Neutrophil % 61.1 42.7 - 76.0 %    Lymphocyte % 28.6 19.6 - 45.3 %    Monocyte % 8.1 5.0 - 12.0 %    Eosinophil % 1.1 0.3 - 6.2 %    Basophil % 0.4 0.0 - 1.5 %    Immature Grans % 0.7 (H) 0.0 - 0.5 %    Neutrophils, Absolute 4.98 1.70 - 7.00 10*3/mm3    Lymphocytes, Absolute 2.33 0.70 - 3.10 10*3/mm3    Monocytes, Absolute 0.66 0.10 - 0.90 10*3/mm3    Eosinophils, Absolute 0.09 0.00 - 0.40 10*3/mm3    Basophils, Absolute 0.03 0.00 - 0.20 10*3/mm3    Immature Grans, Absolute 0.06 (H) 0.00 - 0.05 10*3/mm3    nRBC 0.0 0.0 - 0.2 /100 WBC         Sister had MI----SCAD spontaneous coronary artery disection--CAD--age 35 and she just saw DR Castañeda --had cardiac work up with him.  Although this condition can be genetic---SCAD--I do want patient to have her LDL cholesterol less than 100 due to her history of hypertension and minimize any risk of heart disease possible.  Will get follow-up labs in 3 months.  Work on weight loss as well want BMI less than 25      CT chest____FINDINGS:    No pathologically enlarged thoracic lymph nodes are identified. Heart  size is normal. There is no pericardial effusion. There is mild calcific  aortic atherosclerosis. Pleural spaces are clear.  Limited imaging through the upper abdomen is within normal limits.  There is multilevel degenerative disc disease.  The trachea and central bronchi are clear. There is no focal  consolidation. There is a 0.4 cm nodule at the posterolateral right lung  base. There is a 0.4 cm nodule along the right major fissure. There is a  0.2 cm nodule in the medial right lower lobe.        IMPRESSION:  A few scattered pulmonary nodules in the right lung  measuring up to 0.4 cm are nonspecific. Recommend comparison with prior  imaging. Consider follow-up CT chest in  one year.  The following portions of the patient's history were reviewed and updated as appropriate: allergies, current medications, past family history, past medical history, past social history, past surgical history and problem list.    Review of Systems   Constitutional: Positive for activity change, appetite change, fatigue and unexpected weight change.   HENT: Negative for nosebleeds and trouble swallowing.    Eyes: Negative for pain and visual disturbance.   Respiratory: Positive for shortness of breath. Negative for chest tightness and wheezing.    Cardiovascular: Positive for palpitations. Negative for chest pain.   Gastrointestinal: Negative for abdominal pain and blood in stool.   Endocrine: Negative.    Genitourinary: Negative for difficulty urinating and hematuria.   Musculoskeletal: Negative for joint swelling.   Skin: Negative for color change and rash.   Allergic/Immunologic: Negative.    Neurological: Positive for headaches. Negative for syncope and speech difficulty.   Hematological: Negative for adenopathy.   Psychiatric/Behavioral: Positive for agitation, decreased concentration and sleep disturbance. Negative for confusion. The patient is nervous/anxious.    All other systems reviewed and are negative.      Objective   Physical Exam  Vitals signs and nursing note reviewed.   Constitutional:       General: She is not in acute distress.     Appearance: She is well-developed. She is not ill-appearing or toxic-appearing.   HENT:      Head: Normocephalic.      Right Ear: External ear normal.      Left Ear: External ear normal.      Nose: Nose normal.      Mouth/Throat:      Pharynx: Oropharynx is clear.   Eyes:      General: No scleral icterus.     Conjunctiva/sclera: Conjunctivae normal.      Pupils: Pupils are equal, round, and reactive to light.   Neck:      Musculoskeletal: Normal range of motion and neck supple.      Thyroid: No thyromegaly.      Vascular: No carotid bruit.   Cardiovascular:       Rate and Rhythm: Normal rate and regular rhythm.      Pulses: Normal pulses.      Heart sounds: Normal heart sounds. No murmur.   Pulmonary:      Effort: Pulmonary effort is normal. No respiratory distress.      Breath sounds: Normal breath sounds.   Musculoskeletal: Normal range of motion.         General: No deformity.      Right lower leg: No edema.      Left lower leg: No edema.   Skin:     General: Skin is warm and dry.      Coloration: Skin is not jaundiced.      Findings: No rash.   Neurological:      General: No focal deficit present.      Mental Status: She is alert and oriented to person, place, and time. Mental status is at baseline.   Psychiatric:         Mood and Affect: Mood normal.         Behavior: Behavior normal.         Thought Content: Thought content normal.         Judgment: Judgment normal.         Assessment/Plan   Diagnoses and all orders for this visit:    1. Essential hypertension (Primary)  -     Comprehensive metabolic panel  -     Lipid panel  -     CBC and Differential  -     TSH  -     T3, Free  -     T4, Free  -     Vitamin B12  -     Folate  -     Vitamin D 25 Hydroxy    2. Vitamin D deficiency  -     Comprehensive metabolic panel  -     Lipid panel  -     CBC and Differential  -     TSH  -     T3, Free  -     T4, Free  -     Vitamin B12  -     Folate  -     Vitamin D 25 Hydroxy    3. Hyperlipidemia, mixed  -     Comprehensive metabolic panel  -     Lipid panel  -     CBC and Differential  -     TSH  -     T3, Free  -     T4, Free  -     Vitamin B12  -     Folate  -     Vitamin D 25 Hydroxy    4. Lung nodule < 6cm on CT    Other orders  -     valsartan (DIOVAN) 320 MG tablet; Take 1 tablet by mouth Daily. For BP with HCTZ  Dispense: 90 tablet; Refill: 1  -     rosuvastatin (CRESTOR) 10 MG tablet; Take 1 tablet by mouth Daily. For cholesterol  Dispense: 90 tablet; Refill: 1        Raise dose Valsartan  Keep HCTZ  CT one year--to follow-up on lung nodules  Start statin  Plan, More  NEO Payne, was seen today.  she was seen for HTN and add/adjust medication, Hyperlipidemia with new medication plan and Vitamin D deficiency and will update labs .    Followed by psychiatry for bipolar disorder and sees pain management

## 2021-04-06 ENCOUNTER — BULK ORDERING (OUTPATIENT)
Dept: CASE MANAGEMENT | Facility: OTHER | Age: 46
End: 2021-04-06

## 2021-04-06 DIAGNOSIS — Z23 IMMUNIZATION DUE: ICD-10-CM

## 2021-11-02 ENCOUNTER — OFFICE VISIT (OUTPATIENT)
Dept: FAMILY MEDICINE CLINIC | Facility: CLINIC | Age: 46
End: 2021-11-02

## 2021-11-02 VITALS
HEIGHT: 68 IN | TEMPERATURE: 97.1 F | OXYGEN SATURATION: 99 % | DIASTOLIC BLOOD PRESSURE: 81 MMHG | BODY MASS INDEX: 27.58 KG/M2 | HEART RATE: 81 BPM | SYSTOLIC BLOOD PRESSURE: 130 MMHG | WEIGHT: 182 LBS | RESPIRATION RATE: 16 BRPM

## 2021-11-02 DIAGNOSIS — R50.9 COUGH WITH FEVER: ICD-10-CM

## 2021-11-02 DIAGNOSIS — R05.9 COUGH WITH FEVER: ICD-10-CM

## 2021-11-02 DIAGNOSIS — J06.9 ACUTE URI: Primary | ICD-10-CM

## 2021-11-02 PROCEDURE — 99214 OFFICE O/P EST MOD 30 MIN: CPT | Performed by: NURSE PRACTITIONER

## 2021-11-02 RX ORDER — LEVOFLOXACIN 500 MG/1
TABLET, FILM COATED ORAL
Qty: 10 TABLET | Refills: 0 | Status: SHIPPED | OUTPATIENT
Start: 2021-11-02 | End: 2022-02-23

## 2021-11-02 NOTE — PATIENT INSTRUCTIONS
"Upper Respiratory Infection, Adult  An upper respiratory infection (URI) affects the nose, throat, and upper air passages. URIs are caused by germs (viruses). The most common type of URI is often called \"the common cold.\"  Medicines cannot cure URIs, but you can do things at home to relieve your symptoms. URIs usually get better within 7-10 days.  Follow these instructions at home:  Activity  · Rest as needed.  · If you have a fever, stay home from work or school until your fever is gone, or until your doctor says you may return to work or school.  ? You should stay home until you cannot spread the infection anymore (you are not contagious).  ? Your doctor may have you wear a face mask so you have less risk of spreading the infection.  Relieving symptoms  · Gargle with a salt-water mixture 3-4 times a day or as needed. To make a salt-water mixture, completely dissolve ½-1 tsp of salt in 1 cup of warm water.  · Use a cool-mist humidifier to add moisture to the air. This can help you breathe more easily.  Eating and drinking    · Drink enough fluid to keep your pee (urine) pale yellow.  · Eat soups and other clear broths.    General instructions    · Take over-the-counter and prescription medicines only as told by your doctor. These include cold medicines, fever reducers, and cough suppressants.  · Do not use any products that contain nicotine or tobacco. These include cigarettes and e-cigarettes. If you need help quitting, ask your doctor.  · Avoid being where people are smoking (avoid secondhand smoke).  · Make sure you get regular shots and get the flu shot every year.  · Keep all follow-up visits as told by your doctor. This is important.    How to avoid spreading infection to others    · Wash your hands often with soap and water. If you do not have soap and water, use hand .  · Avoid touching your mouth, face, eyes, or nose.  · Cough or sneeze into a tissue or your sleeve or elbow. Do not cough or sneeze " "into your hand or into the air.    Contact a doctor if:  · You are getting worse, not better.  · You have any of these:  ? A fever.  ? Chills.  ? Brown or red mucus in your nose.  ? Yellow or brown fluid (discharge)coming from your nose.  ? Pain in your face, especially when you bend forward.  ? Swollen neck glands.  ? Pain with swallowing.  ? White areas in the back of your throat.  Get help right away if:  · You have shortness of breath that gets worse.  · You have very bad or constant:  ? Headache.  ? Ear pain.  ? Pain in your forehead, behind your eyes, and over your cheekbones (sinus pain).  ? Chest pain.  · You have long-lasting (chronic) lung disease along with any of these:  ? Wheezing.  ? Long-lasting cough.  ? Coughing up blood.  ? A change in your usual mucus.  · You have a stiff neck.  · You have changes in your:  ? Vision.  ? Hearing.  ? Thinking.  ? Mood.  Summary  · An upper respiratory infection (URI) is caused by a germ called a virus. The most common type of URI is often called \"the common cold.\"  · URIs usually get better within 7-10 days.  · Take over-the-counter and prescription medicines only as told by your doctor.  This information is not intended to replace advice given to you by your health care provider. Make sure you discuss any questions you have with your health care provider.  Document Revised: 12/26/2019 Document Reviewed: 08/10/2018  Elsevier Patient Education © 2021 [employer]    "

## 2021-11-02 NOTE — PROGRESS NOTES
Chief Complaint  Head Congestion, Chest Pain Left side, and Sore Throat    Subjective          Felicita presents to [facility]       46-year-old female presents to the office today complaining of sinus pain and pressure that started last Monday she is now having increased nasal congestion, fever intermittent low-grade T-max 100.0, sore throat, increasing cough dips, dyspnea on exertion, fatigue.  Onset of symptoms was last Monday rapidly worsening since that time.  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  She has been trying over-the-counter cold and sinus medicine with no relief.      She has an active problem list of the following    Patient Active Problem List   Diagnosis   • Anxiety   • Bipolar disorder (HCC)   • Dysphagia   • Fibromyalgia   • Migraine   • Tension headache   • Thyroid disorder   • Essential hypertension   • Low back pain       She has been compliant in the following medications    Current Outpatient Medications on File Prior to Visit   Medication Sig Dispense Refill   • hydroCHLOROthiazide (MICROZIDE) 12.5 MG capsule Take 12.5 mg by mouth Daily.     • Lidocaine (ZTlido) 1.8 % patch      • LORazepam (ATIVAN) 0.5 MG tablet      • oxyCODONE-acetaminophen (PERCOCET) 7.5-325 MG per tablet      • valsartan (DIOVAN) 320 MG tablet Take 1 tablet by mouth Daily. For BP with HCTZ 90 tablet 1   • [DISCONTINUED] buPROPion SR (Wellbutrin SR) 150 MG 12 hr tablet Take 150 mg by mouth 2 (Two) Times a Day.     • [DISCONTINUED] lamoTRIgine (LaMICtal) 100 MG tablet      • [DISCONTINUED] rosuvastatin (CRESTOR) 10 MG tablet Take 1 tablet by mouth Daily. For cholesterol 90 tablet 1     No current facility-administered medications on file prior to visit.       She denies any medication side effects    The following portions of the patient's history were reviewed and updated as appropriate: allergies, current medications, past family history, past medical history, past  "social history, past surgical history, and problem list          Review of Systems   Constitutional: Positive for fatigue and fever.   HENT: Positive for congestion and sinus pressure.    Respiratory: Positive for cough and shortness of breath.         Sob with exertion-    Cardiovascular: Negative.    Gastrointestinal: Negative.  Negative for abdominal pain, diarrhea, nausea and vomiting.   Genitourinary: Negative.    Musculoskeletal: Negative.    Skin: Negative.  Negative for rash.   Neurological: Negative.    Psychiatric/Behavioral: Negative.         Objective   Vital Signs:   Vitals:    11/02/21 1316   BP: 130/81   Pulse: 81   Resp: 16   Temp: 97.1 °F (36.2 °C)   SpO2: 99%   Weight: 82.6 kg (182 lb)   Height: 172.7 cm (67.99\")        Physical Exam  Constitutional:       General: She is not in acute distress.     Appearance: Normal appearance. She is normal weight. She is not ill-appearing.   HENT:      Head: Normocephalic.      Nose: Nose normal. No congestion.   Eyes:      Pupils: Pupils are equal, round, and reactive to light.   Cardiovascular:      Rate and Rhythm: Normal rate and regular rhythm.      Pulses: Normal pulses.      Heart sounds: Normal heart sounds. No murmur heard.      Pulmonary:      Effort: Pulmonary effort is normal. No respiratory distress.      Breath sounds: No stridor. Examination of the left-upper field reveals rhonchi. Wheezing and rhonchi present. No rales.   Chest:      Chest wall: No tenderness.   Abdominal:      General: Abdomen is flat. There is no distension.      Palpations: Abdomen is soft.   Musculoskeletal:      Cervical back: Normal range of motion and neck supple.   Skin:     General: Skin is warm and dry.      Capillary Refill: Capillary refill takes less than 2 seconds.      Findings: No rash.   Neurological:      Mental Status: She is alert and oriented to person, place, and time.   Psychiatric:         Mood and Affect: Mood normal.         Behavior: Behavior normal.    "      Thought Content: Thought content normal.         Judgment: Judgment normal.          Result Review :     {The following data was reviewed by (Fabiana NICOLE           Assessment and Plan    Diagnoses and all orders for this visit:    1. Acute URI (Primary)  -     levoFLOXacin (LEVAQUIN) 500 MG tablet; One PO daily for infection  Dispense: 10 tablet; Refill: 0    2. Cough with fever  Comments:  worsening      Plan  She does best on Levaquin  Take medication as prescribed  Stop antibiotic if tender pain develops this medication can cause tendon rupture very rare  Increase fluid intake  Declined chest x-ray today-return if symptoms worsen  Cannot take steroids/azithromycin  Quarantine until negative Covid results      Follow Up   Return if symptoms worsen or fail to improve.  Patient was given instructions and counseling regarding her condition or for health maintenance advice. Please see specific information pulled into the AVS if appropriate.

## 2021-11-03 LAB
LABCORP SARS-COV-2, NAA 2 DAY TAT: NORMAL
SARS-COV-2 RNA RESP QL NAA+PROBE: NOT DETECTED

## 2022-02-23 ENCOUNTER — OFFICE VISIT (OUTPATIENT)
Dept: FAMILY MEDICINE CLINIC | Facility: CLINIC | Age: 47
End: 2022-02-23

## 2022-02-23 VITALS
RESPIRATION RATE: 16 BRPM | TEMPERATURE: 97.6 F | DIASTOLIC BLOOD PRESSURE: 79 MMHG | BODY MASS INDEX: 26.83 KG/M2 | HEIGHT: 68 IN | WEIGHT: 177 LBS | OXYGEN SATURATION: 98 % | HEART RATE: 67 BPM | SYSTOLIC BLOOD PRESSURE: 122 MMHG

## 2022-02-23 DIAGNOSIS — H50.40 HETEROTROPIA: ICD-10-CM

## 2022-02-23 DIAGNOSIS — R93.0 ABNORMAL MRI SCAN, HEAD: ICD-10-CM

## 2022-02-23 DIAGNOSIS — Z09 HOSPITAL DISCHARGE FOLLOW-UP: Primary | ICD-10-CM

## 2022-02-23 DIAGNOSIS — H53.9 VISION DISTURBANCE: ICD-10-CM

## 2022-02-23 DIAGNOSIS — E53.8 LOW SERUM VITAMIN B12: ICD-10-CM

## 2022-02-23 DIAGNOSIS — R42 DIZZINESS: ICD-10-CM

## 2022-02-23 PROCEDURE — 99214 OFFICE O/P EST MOD 30 MIN: CPT | Performed by: PHYSICIAN ASSISTANT

## 2022-02-23 RX ORDER — CYANOCOBALAMIN 1000 UG/ML
1000 INJECTION, SOLUTION INTRAMUSCULAR; SUBCUTANEOUS ONCE
Qty: 10 ML | Refills: 11 | Status: SHIPPED | OUTPATIENT
Start: 2022-02-23 | End: 2022-02-23

## 2022-02-23 RX ORDER — SYRINGE W-NEEDLE,DISPOSAB,3 ML 25GX5/8"
SYRINGE, EMPTY DISPOSABLE MISCELLANEOUS
Qty: 20 EACH | Refills: 0 | Status: SHIPPED | OUTPATIENT
Start: 2022-02-23 | End: 2022-04-04

## 2022-02-23 NOTE — PROGRESS NOTES
Subjective   More Payne is a 46 y.o. female.     History of Present Illness   More Payne 46 y.o. female presents today for hosptial follow up.  she was treated 2-17 to 2- for rule out TIA;  Had pop in head, nausea.  Feels lightheaded and off balance. Has blurred vision.  -----this is now episodic----happens 5 times a day; 2-30 min.  Can wake her up and happen at rest.  .  I reviewed all of the labs and diagnostic testing and noted:  See below;    The patient's medications were not changed:  Current outpatient and discharge medications have been reconciled for the patient.  Reviewed by: Kelley Ferguson PA-C    she does have a follow up appointment with a specialist:       Discharged Condition: stable    Hospital Course:   Patient presented to hospital for evaluation of dizziness feeling off balance stumbling and a pop sensation in her head.  On arrival to the emergency room, patient underwent CT imaging of the brain which was nonrevealing. Neurology was consulted.  Patient underwent MRI of the brain which did not reveal any CVA, bleed or masses.  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  Patient would like to be discharged soon as possible, does not want to wait to be seen by neurology for follow-up.   Given complete resolution of her symptoms and lack of acute findings on MRI. We will discharge her home with neurology follow-up as outpatient  At this time she is optimized for discharge    CTA head and neck 2-17-22  IMPRESSION: No significant stenotic or occlusive disease in the carotid or vertebral arteries.        FINDINGS: Asymmetry of the vertebral arteries is noted, with the left being dominant compared to the right. The visualized portions of the internal carotid and basilar arteries are within normal limits. The anterior, middle, and posterior cerebral   circulations are likewise unremarkable.  There  is no evidence of aneurysm, tumor vascularity, or arteriovenous malformation.  The venous sinuses and internal cerebral veins appear normal as well.     EKG 2-17-21 same      Brain MRI without contrast  No acute intracranial abnormality. No acute infarction, hemorrhage, or mass.   2. Unilateral right subependymal gray matter heterotopia.     I do want her to have MRI with contrast d/t abn noncontrast    Cardio does her HTN Rx  The following portions of the patient's history were reviewed and updated as appropriate: allergies, current medications, past family history, past medical history, past social history, past surgical history and problem list.    Review of Systems   Constitutional: Positive for activity change and appetite change. Negative for unexpected weight change.   HENT: Negative for nosebleeds and trouble swallowing.    Eyes: Positive for visual disturbance. Negative for pain.   Respiratory: Negative for chest tightness, shortness of breath and wheezing.    Cardiovascular: Negative for chest pain and palpitations.   Gastrointestinal: Positive for nausea. Negative for abdominal pain and blood in stool.   Endocrine: Negative.    Genitourinary: Negative for difficulty urinating and hematuria.   Musculoskeletal: Negative for joint swelling.   Skin: Negative for color change and rash.   Allergic/Immunologic: Negative.    Neurological: Positive for dizziness, weakness and light-headedness. Negative for syncope, speech difficulty, numbness and headaches.   Hematological: Negative for adenopathy.   Psychiatric/Behavioral: Positive for decreased concentration. Negative for agitation and confusion.   All other systems reviewed and are negative.      Objective   Physical Exam  Vitals and nursing note reviewed.   Constitutional:       General: She is not in acute distress.     Appearance: She is well-developed. She is not ill-appearing or toxic-appearing.   HENT:      Head: Normocephalic.      Right Ear: External  ear normal.      Left Ear: External ear normal.      Nose: Nose normal.      Mouth/Throat:      Pharynx: Oropharynx is clear.   Eyes:      General: No scleral icterus.     Conjunctiva/sclera: Conjunctivae normal.      Pupils: Pupils are equal, round, and reactive to light.   Neck:      Thyroid: No thyromegaly.   Cardiovascular:      Rate and Rhythm: Normal rate and regular rhythm.      Heart sounds: Normal heart sounds. No murmur heard.      Pulmonary:      Effort: Pulmonary effort is normal. No respiratory distress.      Breath sounds: Normal breath sounds.   Musculoskeletal:         General: No deformity. Normal range of motion.      Cervical back: Normal range of motion and neck supple.   Skin:     General: Skin is warm and dry.      Findings: No rash.   Neurological:      General: No focal deficit present.      Mental Status: She is alert and oriented to person, place, and time. Mental status is at baseline.   Psychiatric:         Mood and Affect: Mood normal.         Behavior: Behavior normal.         Thought Content: Thought content normal.         Judgment: Judgment normal.         Assessment/Plan   Diagnoses and all orders for this visit:    1. Hospital discharge follow-up (Primary)    2. Dizziness    3. Low serum vitamin B12    4. Vision disturbance        Vitamin B12 is in lower range.  Start B12 injections weekly X 4 then montly  Labs 3 mos  Just to complete work up---see ENT  MRI with contrast---still dizzy and vision disturbance and subependymal gray matter heterotopia  See neurologist  Cont same meds  CT chest due             Answers for HPI/ROS submitted by the patient on 2/23/2022  Please describe your symptoms.: Wxplain at visit  Have you had these symptoms before?: Yes  How long have you been having these symptoms?: Greater than 2 weeks  What is the primary reason for your visit?: Other

## 2022-02-23 NOTE — PATIENT INSTRUCTIONS
Dizziness  Dizziness is a common problem. It is a feeling of unsteadiness or light-headedness. You may feel like you are about to faint. Dizziness can lead to injury if you stumble or fall. Anyone can become dizzy, but dizziness is more common in older adults. This condition can be caused by a number of things, including medicines, dehydration, or illness.  Follow these instructions at home:  Eating and drinking  · Drink enough fluid to keep your urine clear or pale yellow. This helps to keep you from becoming dehydrated. Try to drink more clear fluids, such as water.  · Do not drink alcohol.  · Limit your caffeine intake if told to do so by your health care provider. Check ingredients and nutrition facts to see if a food or beverage contains caffeine.  · Limit your salt (sodium) intake if told to do so by your health care provider. Check ingredients and nutrition facts to see if a food or beverage contains sodium.  Activity  · Avoid making quick movements.  ? Rise slowly from chairs and steady yourself until you feel okay.  ? In the morning, first sit up on the side of the bed. When you feel okay, stand slowly while you hold onto something until you know that your balance is fine.  · If you need to  one place for a long time, move your legs often. Tighten and relax the muscles in your legs while you are standing.  · Do not drive or use heavy machinery if you feel dizzy.  · Avoid bending down if you feel dizzy. Place items in your home so that they are easy for you to reach without leaning over.  Lifestyle  · Do not use any products that contain nicotine or tobacco, such as cigarettes and e-cigarettes. If you need help quitting, ask your health care provider.  · Try to reduce your stress level by using methods such as yoga or meditation. Talk with your health care provider if you need help to manage your stress.  General instructions  · Watch your dizziness for any changes.  · Take over-the-counter and  prescription medicines only as told by your health care provider. Talk with your health care provider if you think that your dizziness is caused by a medicine that you are taking.  · Tell a friend or a family member that you are feeling dizzy. If he or she notices any changes in your behavior, have this person call your health care provider.  · Keep all follow-up visits as told by your health care provider. This is important.  Contact a health care provider if:  · Your dizziness does not go away.  · Your dizziness or light-headedness gets worse.  · You feel nauseous.  · You have reduced hearing.  · You have new symptoms.  · You are unsteady on your feet or you feel like the room is spinning.  Get help right away if:  · You vomit or have diarrhea and are unable to eat or drink anything.  · You have problems talking, walking, swallowing, or using your arms, hands, or legs.  · You feel generally weak.  · You are not thinking clearly or you have trouble forming sentences. It may take a friend or family member to notice this.  · You have chest pain, abdominal pain, shortness of breath, or sweating.  · Your vision changes.  · You have any bleeding.  · You have a severe headache.  · You have neck pain or a stiff neck.  · You have a fever.  These symptoms may represent a serious problem that is an emergency. Do not wait to see if the symptoms will go away. Get medical help right away. Call your local emergency services (911 in the [place] Do not drive yourself to the hospital.  Summary  · Dizziness is a feeling of unsteadiness or light-headedness. This condition can be caused by a number of things, including medicines, dehydration, or illness.  · Anyone can become dizzy, but dizziness is more common in older adults.  · Drink enough fluid to keep your urine clear or pale yellow. Do not drink alcohol.  · Avoid making quick movements if you feel dizzy. Monitor your dizziness for any changes.  This information is not intended to  replace advice given to you by your health care provider. Make sure you discuss any questions you have with your health care provider.  Document Revised: 12/21/2018 Document Reviewed: 01/20/2018  Elsevier Patient Education © 2021 [employer]

## 2022-03-02 ENCOUNTER — OFFICE VISIT (OUTPATIENT)
Dept: NEUROLOGY | Facility: CLINIC | Age: 47
End: 2022-03-02

## 2022-03-02 VITALS
SYSTOLIC BLOOD PRESSURE: 130 MMHG | BODY MASS INDEX: 27.13 KG/M2 | HEART RATE: 78 BPM | DIASTOLIC BLOOD PRESSURE: 78 MMHG | HEIGHT: 68 IN | OXYGEN SATURATION: 98 % | WEIGHT: 179 LBS

## 2022-03-02 DIAGNOSIS — Z86.69 HISTORY OF MIGRAINE HEADACHES: ICD-10-CM

## 2022-03-02 DIAGNOSIS — E53.8 LOW SERUM VITAMIN B12: Primary | ICD-10-CM

## 2022-03-02 DIAGNOSIS — R42 DIZZINESS: ICD-10-CM

## 2022-03-02 PROCEDURE — 99204 OFFICE O/P NEW MOD 45 MIN: CPT | Performed by: PSYCHIATRY & NEUROLOGY

## 2022-03-02 RX ORDER — CYANOCOBALAMIN 1000 UG/ML
1000 INJECTION, SOLUTION INTRAMUSCULAR; SUBCUTANEOUS DAILY
Qty: 12 ML | Refills: 0 | Status: SHIPPED | OUTPATIENT
Start: 2022-03-02 | End: 2022-06-07

## 2022-03-02 RX ORDER — CYANOCOBALAMIN 1000 UG/ML
INJECTION, SOLUTION INTRAMUSCULAR; SUBCUTANEOUS
COMMUNITY
Start: 2022-02-23 | End: 2022-03-02

## 2022-03-02 NOTE — PROGRESS NOTES
"Chief Complaint   Patient presents with   • Dizziness   • Balance Issues       Patient ID: More Payne is a 46 y.o. female.    HPI:  I had the pleasure of seeing your patient today.  As you may know she is a 46-year-old female being seen at the request of and referred to us by CATHY Zuñiga for a history of migraine headaches and status post discharge for episode of dizziness who is here for hospital discharge follow-up.  Patient says that she was at home in her usual state of health when she experienced an \"electric shock/pop sensation in the head\".  She says that this was accompanied by abrupt onset of distortion of vision.  She says that she had poor balance at the time as well.  She felt very nauseous and was having difficulty ambulating.  She said that due to the fact that she could not walk right and her blood pressure was elevated she went to the ER.  She was thought to have had some type of migraine phenomenon.  Her symptoms continued for several hours in the ER.  She was given a migraine headache cocktail as well as a CT scan and MRI of the brain.  The brain MRI did show evidence for unilateral right subependymal gray matter heterotopia otherwise negative.  She says that that has been noted in the past by previous neurologist whom she saw for migraine headache previously.  She says that status post discharge she has had several episodes.  She averages 4-5 episodes per day.  They last for several seconds to minutes at a time.  It has been quite difficult for her to perform her work duties due to the symptoms.  She says that her symptoms are exacerbated when walking, lying or standing.  She denies any abrupt onset focal weakness or numbness of her arms or legs.  She has had some generalized numbness and tingling.  During her hospitalization her vitamin B12 level was noted to be 331.  She was started on vitamin B12 injections.  She has also noted some memory issues.  The following portions of the " patient's history were reviewed and updated as appropriate: allergies, current medications, past family history, past medical history, past social history, past surgical history and problem list.    Review of Systems   Constitutional: Positive for appetite change, fatigue and unexpected weight change.   HENT: Negative for hearing loss, tinnitus and trouble swallowing.    Eyes: Positive for visual disturbance. Negative for photophobia and redness.   Respiratory: Negative for cough, shortness of breath and wheezing.    Cardiovascular: Positive for palpitations. Negative for chest pain and leg swelling.   Gastrointestinal: Positive for nausea. Negative for diarrhea and vomiting.   Endocrine: Negative for cold intolerance, heat intolerance and polydipsia.   Genitourinary: Negative for difficulty urinating and urgency.   Musculoskeletal: Positive for gait problem. Negative for back pain and neck pain.   Skin: Negative for color change, rash and wound.   Allergic/Immunologic: Negative for environmental allergies, food allergies and immunocompromised state.   Neurological: Positive for dizziness, light-headedness and numbness. Negative for tremors, seizures, syncope, facial asymmetry, speech difficulty, weakness and headaches.   Hematological: Negative for adenopathy. Does not bruise/bleed easily.   Psychiatric/Behavioral: Negative for sleep disturbance. The patient is nervous/anxious.       I have reviewed the review of systems above performed by my medical assistant.      Vitals:    03/02/22 1502   BP: 130/78   Pulse: 78   SpO2: 98%       Neurologic Exam     Mental Status   Oriented to person, place, and time.   Registration: recalls 3 of 3 objects. Follows 3 step commands.   Attention: normal. Concentration: normal.   Speech: speech is normal   Level of consciousness: alert  Knowledge: consistent with education (No deficits found.).   Normal comprehension.     Cranial Nerves     CN II   Visual fields full to  confrontation.     CN III, IV, VI   Pupils are equal, round, and reactive to light.  Extraocular motions are normal.   CN III: no CN III palsy  CN VI: no CN VI palsy  Nystagmus: none   Diplopia: none    CN V   Facial sensation intact.     CN VII   Facial expression full, symmetric.     CN VIII   CN VIII normal.     CN IX, X   CN IX normal.   CN X normal.     CN XI   CN XI normal.     CN XII   CN XII normal.     Motor Exam   Muscle bulk: normal  Right arm tone: normal  Left arm tone: normal  Right leg tone: normal  Left leg tone: normal    Strength   Right neck flexion: 5/5  Left neck flexion: 5/5  Right neck extension: 5/5  Left neck extension: 5/5  Right deltoid: 5/5  Left deltoid: 5/5  Right biceps: 5/5  Left biceps: 5/5  Right triceps: 5/5  Left triceps: 5/5  Right wrist flexion: 5/5  Left wrist flexion: 5/5  Right wrist extension: 5/5  Left wrist extension: 5/5  Right interossei: 5/5  Left interossei: 5/5  Right abdominals: 5/5  Left abdominals: 5/5  Right iliopsoas: 5/5  Left iliopsoas: 5/5  Right quadriceps: 5/5  Left quadriceps: 5/5  Right hamstrin/5  Left hamstrin/5  Right glutei: 5/5  Left glutei: 5/5  Right anterior tibial: 5/5  Left anterior tibial: 5/5  Right posterior tibial: 5/5  Left posterior tibial: 5/5  Right peroneal: 5/5  Left peroneal: 5/5  Right gastroc: 5/5  Left gastroc: 5/5    Sensory Exam   Light touch normal.   Vibration normal.   Proprioception normal.   Pinprick normal.     Gait, Coordination, and Reflexes     Gait  Gait: normal    Coordination   Romberg: negative    Tremor   Resting tremor: absent  Intention tremor: absent    Reflexes   Right brachioradialis: 2+  Left brachioradialis: 2+  Right biceps: 2+  Left biceps: 2+  Right triceps: 2+  Left triceps: 2+  Right patellar: 2+  Left patellar: 2+  Right achilles: 2+  Left achilles: 2+  Right : 2+  Left : 2+Station is normal.       Physical Exam  Vitals reviewed.   Constitutional:       General: She is not in acute  distress.     Appearance: She is well-developed.   HENT:      Head: Normocephalic and atraumatic.   Eyes:      Extraocular Movements: EOM normal.      Pupils: Pupils are equal, round, and reactive to light.   Cardiovascular:      Rate and Rhythm: Normal rate and regular rhythm.      Heart sounds: Normal heart sounds.   Pulmonary:      Effort: Pulmonary effort is normal. No respiratory distress.      Breath sounds: Normal breath sounds.   Abdominal:      General: Bowel sounds are normal. There is no distension.      Palpations: Abdomen is soft.      Tenderness: There is no abdominal tenderness.   Musculoskeletal:         General: No deformity.      Cervical back: Normal range of motion.   Skin:     General: Skin is warm.      Findings: No rash.   Neurological:      Mental Status: She is oriented to person, place, and time.      Coordination: Romberg Test normal.      Gait: Gait is intact.      Deep Tendon Reflexes:      Reflex Scores:       Tricep reflexes are 2+ on the right side and 2+ on the left side.       Bicep reflexes are 2+ on the right side and 2+ on the left side.       Brachioradialis reflexes are 2+ on the right side and 2+ on the left side.       Patellar reflexes are 2+ on the right side and 2+ on the left side.       Achilles reflexes are 2+ on the right side and 2+ on the left side.  Psychiatric:         Speech: Speech normal.         Judgment: Judgment normal.         Procedures    Assessment/Plan: I would like to refer her to the [facility] for vestibular assessment and potential treatment.  She does have vitamin B12 deficiency.  I did recommend the vitamin B12 injection protocol.  No need to repeat MRI imaging.  We will see her back in approximately 4 months or sooner if needed.       Diagnoses and all orders for this visit:    1. Low serum vitamin B12 (Primary)  -     cyanocobalamin 1000 MCG/ML injection; Inject 1 mL into the appropriate muscle as directed by prescriber Daily. 1 mL  q week x 4 weeks, 1 mL qow x 4 mos  Dispense: 12 mL; Refill: 0    2. Dizziness  -     Basic Vestibular Evaluation; Future    3. History of migraine headaches           Vincent Gilman II, MD

## 2022-03-09 ENCOUNTER — PATIENT ROUNDING (BHMG ONLY) (OUTPATIENT)
Dept: NEUROLOGY | Facility: CLINIC | Age: 47
End: 2022-03-09

## 2022-03-16 ENCOUNTER — HOSPITAL ENCOUNTER (OUTPATIENT)
Dept: MRI IMAGING | Facility: HOSPITAL | Age: 47
Discharge: HOME | End: 2022-03-16
Admitting: PHYSICIAN ASSISTANT

## 2022-03-16 DIAGNOSIS — H53.9 VISION DISTURBANCE: ICD-10-CM

## 2022-03-16 DIAGNOSIS — R93.0 ABNORMAL MRI SCAN, HEAD: ICD-10-CM

## 2022-03-16 DIAGNOSIS — H50.40 HETEROTROPIA: ICD-10-CM

## 2022-03-16 DIAGNOSIS — R42 DIZZINESS: ICD-10-CM

## 2022-03-16 PROCEDURE — 70553 MRI BRAIN STEM W/O & W/DYE: CPT

## 2022-03-16 PROCEDURE — 0 GADOBENATE DIMEGLUMINE 529 MG/ML SOLUTION: Performed by: PHYSICIAN ASSISTANT

## 2022-03-16 PROCEDURE — A9577 INJ MULTIHANCE: HCPCS | Performed by: PHYSICIAN ASSISTANT

## 2022-03-16 RX ADMIN — GADOBENATE DIMEGLUMINE 17 ML: 529 INJECTION, SOLUTION INTRAVENOUS at 21:23

## 2022-04-04 RX ORDER — SYRINGE WITH NEEDLE, 1 ML 25GX5/8"
SYRINGE, EMPTY DISPOSABLE MISCELLANEOUS
Qty: 20 EACH | Refills: 0 | Status: SHIPPED | OUTPATIENT
Start: 2022-04-04

## 2022-06-06 ENCOUNTER — TRANSCRIBE ORDERS (OUTPATIENT)
Dept: ADMINISTRATIVE | Facility: HOSPITAL | Age: 47
End: 2022-06-06

## 2022-06-06 DIAGNOSIS — M25.551 RIGHT HIP PAIN: Primary | ICD-10-CM

## 2022-06-07 DIAGNOSIS — E53.8 LOW SERUM VITAMIN B12: ICD-10-CM

## 2022-06-07 RX ORDER — CYANOCOBALAMIN 1000 UG/ML
INJECTION, SOLUTION INTRAMUSCULAR; SUBCUTANEOUS
Qty: 12 ML | Refills: 0 | Status: SHIPPED | OUTPATIENT
Start: 2022-06-07

## 2022-06-29 ENCOUNTER — HOSPITAL ENCOUNTER (OUTPATIENT)
Dept: GENERAL RADIOLOGY | Facility: HOSPITAL | Age: 47
Discharge: HOME | End: 2022-06-29

## 2022-06-29 ENCOUNTER — HOSPITAL ENCOUNTER (OUTPATIENT)
Dept: MRI IMAGING | Facility: HOSPITAL | Age: 47
Discharge: HOME | End: 2022-06-29

## 2022-06-29 DIAGNOSIS — M25.551 RIGHT HIP PAIN: ICD-10-CM

## 2022-06-29 PROCEDURE — 25010000002 METHYLPREDNISOLONE PER 40 MG: Performed by: SPECIALIST

## 2022-06-29 PROCEDURE — 0 GADOBENATE DIMEGLUMINE 529 MG/ML SOLUTION: Performed by: SPECIALIST

## 2022-06-29 PROCEDURE — 0 LIDOCAINE 1 % SOLUTION: Performed by: SPECIALIST

## 2022-06-29 PROCEDURE — 73722 MRI JOINT OF LWR EXTR W/DYE: CPT

## 2022-06-29 PROCEDURE — 25010000002 IOPAMIDOL 61 % SOLUTION: Performed by: SPECIALIST

## 2022-06-29 PROCEDURE — A9577 INJ MULTIHANCE: HCPCS | Performed by: SPECIALIST

## 2022-06-29 PROCEDURE — 77002 NEEDLE LOCALIZATION BY XRAY: CPT

## 2022-06-29 RX ORDER — METHYLPREDNISOLONE SODIUM SUCCINATE 40 MG/ML
40 INJECTION, POWDER, LYOPHILIZED, FOR SOLUTION INTRAMUSCULAR; INTRAVENOUS
Status: COMPLETED | OUTPATIENT
Start: 2022-06-29 | End: 2022-06-29

## 2022-06-29 RX ORDER — LIDOCAINE HYDROCHLORIDE 10 MG/ML
10 INJECTION, SOLUTION INFILTRATION; PERINEURAL ONCE
Status: COMPLETED | OUTPATIENT
Start: 2022-06-29 | End: 2022-06-29

## 2022-06-29 RX ORDER — BUPIVACAINE HYDROCHLORIDE 2.5 MG/ML
10 INJECTION, SOLUTION EPIDURAL; INFILTRATION; INTRACAUDAL ONCE
Status: COMPLETED | OUTPATIENT
Start: 2022-06-29 | End: 2022-06-29

## 2022-06-29 RX ADMIN — IOPAMIDOL 4 ML: 612 INJECTION, SOLUTION INTRATHECAL at 08:55

## 2022-06-29 RX ADMIN — GADOBENATE DIMEGLUMINE 0.05 ML: 529 INJECTION, SOLUTION INTRAVENOUS at 08:55

## 2022-06-29 RX ADMIN — LIDOCAINE HYDROCHLORIDE 4 ML: 10 INJECTION, SOLUTION INFILTRATION; PERINEURAL at 08:55

## 2022-06-29 RX ADMIN — BUPIVACAINE HYDROCHLORIDE 5 ML: 2.5 INJECTION, SOLUTION EPIDURAL; INFILTRATION; INTRACAUDAL; PERINEURAL at 08:55

## 2022-06-29 RX ADMIN — METHYLPREDNISOLONE SODIUM SUCCINATE 40 MG: 40 INJECTION, POWDER, LYOPHILIZED, FOR SOLUTION INTRAMUSCULAR; INTRAVENOUS at 08:55

## 2022-07-01 ENCOUNTER — TRANSCRIBE ORDERS (OUTPATIENT)
Dept: ADMINISTRATIVE | Facility: HOSPITAL | Age: 47
End: 2022-07-01

## 2022-07-01 DIAGNOSIS — M25.552 LEFT HIP PAIN: Primary | ICD-10-CM

## 2022-07-13 ENCOUNTER — APPOINTMENT (OUTPATIENT)
Dept: GENERAL RADIOLOGY | Facility: HOSPITAL | Age: 47
End: 2022-07-13

## 2022-07-13 ENCOUNTER — APPOINTMENT (OUTPATIENT)
Dept: MRI IMAGING | Facility: HOSPITAL | Age: 47
End: 2022-07-13

## 2022-07-15 ENCOUNTER — TELEPHONE (OUTPATIENT)
Dept: FAMILY MEDICINE CLINIC | Facility: CLINIC | Age: 47
End: 2022-07-15

## 2022-07-15 NOTE — TELEPHONE ENCOUNTER
Caller: More Payne    Relationship to patient: Self    Best call back number: [phone]    Chief complaint: NEEDING SURGICAL CLEARANCE     Type of visit: OFFICE     Requested date: 7/18/22 OR 7/19/22    If rescheduling, when is the original appointment: 7/18/22    Additional notes:WOULD LIKE TO KNOW IF TOMASA CAN WORK HER IN SO SHE CAN SEE HER PCP FOR THIS. IS ON SCHEDULE WITH ANOTHER PROVIDER

## 2022-07-18 ENCOUNTER — OFFICE VISIT (OUTPATIENT)
Dept: FAMILY MEDICINE CLINIC | Facility: CLINIC | Age: 47
End: 2022-07-18

## 2022-07-18 VITALS
DIASTOLIC BLOOD PRESSURE: 88 MMHG | OXYGEN SATURATION: 99 % | RESPIRATION RATE: 16 BRPM | SYSTOLIC BLOOD PRESSURE: 138 MMHG | TEMPERATURE: 97.5 F | HEART RATE: 79 BPM | BODY MASS INDEX: 26.73 KG/M2 | WEIGHT: 176.4 LBS | HEIGHT: 68 IN

## 2022-07-18 DIAGNOSIS — Z01.818 PRE-OPERATIVE CLEARANCE: ICD-10-CM

## 2022-07-18 DIAGNOSIS — E78.2 HYPERLIPIDEMIA, MIXED: ICD-10-CM

## 2022-07-18 DIAGNOSIS — E55.9 VITAMIN D DEFICIENCY: ICD-10-CM

## 2022-07-18 DIAGNOSIS — R73.01 IMPAIRED FASTING GLUCOSE: ICD-10-CM

## 2022-07-18 DIAGNOSIS — E53.8 LOW SERUM VITAMIN B12: ICD-10-CM

## 2022-07-18 DIAGNOSIS — I10 ESSENTIAL HYPERTENSION: Primary | ICD-10-CM

## 2022-07-18 PROBLEM — R07.9 CHEST PAIN: Status: ACTIVE | Noted: 2022-03-09

## 2022-07-18 PROBLEM — F32.A ANXIETY AND DEPRESSION: Status: ACTIVE | Noted: 2022-02-18

## 2022-07-18 PROBLEM — F11.20 OPIATE DEPENDENCE: Status: ACTIVE | Noted: 2022-02-18

## 2022-07-18 PROBLEM — Q07.8: Status: ACTIVE | Noted: 2022-04-06

## 2022-07-18 PROBLEM — G93.32 CHRONIC FATIGUE SYNDROME: Status: ACTIVE | Noted: 2022-02-18

## 2022-07-18 PROBLEM — G43.109 BASILAR MIGRAINE: Status: ACTIVE | Noted: 2022-02-18

## 2022-07-18 PROBLEM — G89.4 CHRONIC PAIN DISORDER: Status: ACTIVE | Noted: 2022-02-18

## 2022-07-18 PROBLEM — H81.10 BPV (BENIGN POSITIONAL VERTIGO): Status: ACTIVE | Noted: 2022-02-18

## 2022-07-18 PROBLEM — M46.1 SACROILIITIS: Status: ACTIVE | Noted: 2022-02-18

## 2022-07-18 PROBLEM — M43.06 LUMBAR SPONDYLOLYSIS: Status: ACTIVE | Noted: 2022-02-18

## 2022-07-18 PROBLEM — F41.9 ANXIETY AND DEPRESSION: Status: ACTIVE | Noted: 2022-02-18

## 2022-07-18 PROBLEM — R42 DISEQUILIBRIUM: Status: ACTIVE | Noted: 2022-02-18

## 2022-07-18 PROBLEM — M79.7 FIBROMYALGIA: Status: ACTIVE | Noted: 2022-02-18

## 2022-07-18 PROBLEM — R27.0 ATAXIA: Status: ACTIVE | Noted: 2022-02-18

## 2022-07-18 PROCEDURE — 99214 OFFICE O/P EST MOD 30 MIN: CPT | Performed by: PHYSICIAN ASSISTANT

## 2022-07-18 RX ORDER — HYDROCHLOROTHIAZIDE 12.5 MG/1
12.5 CAPSULE, GELATIN COATED ORAL DAILY
Qty: 90 CAPSULE | Refills: 1 | Status: SHIPPED | OUTPATIENT
Start: 2022-07-18 | End: 2023-01-26

## 2022-07-18 RX ORDER — VALSARTAN 320 MG/1
320 TABLET ORAL DAILY
Qty: 90 TABLET | Refills: 1 | Status: SHIPPED | OUTPATIENT
Start: 2022-07-18 | End: 2023-01-18

## 2022-07-18 NOTE — PROGRESS NOTES
"Subjective   More Payne is a 47 y.o. female.     History of Present Illness      Since the last visit, she has overall felt tired.  She has Primary Hypertension and well controlled on current medication, Impaired fasting glucose and will monitor labs to watch for DMII, Hyperlipidemia and working on this with diet and exercise and Vitamin D deficiency and will update labs for continued management.  she has been compliant with current medications have reviewed them.  The patient denies medication side effects.  Will refill medications. /88   Pulse 79   Temp 97.5 °F (36.4 °C)   Resp 16   Ht 172.7 cm (67.99\")   Wt 80 kg (176 lb 6.4 oz)   LMP 07/11/2022   SpO2 99%   BMI 26.83 kg/m²     Results for orders placed or performed in visit on 11/02/21   COVID-19,[facility] ROUTINE, NP/OP SWAB IN TRANSPORT MEDIA OR ESWAB 72 HR TAT - Swab, Anterior nasal    Specimen: Anterior nasal; Swab    Swab  Previously teste   Result Value Ref Range    SARS-CoV-2, TRUPTI Not Detected Not Detected   SARS-CoV-2, TRUPTI 2 DAY TAT - ,    Swab  Previously teste   Result Value Ref Range    [facility] SARS-COV-2, TRUPTI 2 DAY TAT Performed          Sees pain management---[facility]---sees Abigail  Hip pain--Labral tear right    Did see Dr Bella---neurovascular----4-6-22   In summary, Ms. 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  Has f/u for MRI  Oct    Sees Dr Gilman, neuro---treating her B12; hx migraines  Dizziness is migraine r/t--Khris     Had work up chest pain 3-9-22 cardio eval for bp  Went to ER 3-18-22----BP was high that day. Pain r/t  Procedure: ELECTROCARDIOGRAM RESULT  3-18-22  Heart Rate     59   P-R Interval     140 ms   QRS Interval     80 ms   QT Interval     456 ms   QTC Interval     452 ms   P Axis     36 deg   QRS Axis     53 deg   T Wave Axis     45 " deg   DATE: 03/18/2022 16:34   SINUS BRADYCARDIA   Summary: Otherwise Normal ECG   Electronically signed by Chris Tee  03/18/2022 17:29  Echo normal 2-3-21  Lab Results   Component Value Date    CHLPL 258 (H) 05/23/2018    TRIG 146 05/23/2018    HDL 53 05/23/2018     (H) 05/23/2018   then at [place] 2-18-22   Total 239  2013 AHA (American Heart Association) Cholesterol Risk Ratio Score Goal is <=7.5% and your score is:  1.51%  Has been on statin in past--myalgias ---Lipitor and Crestor  Cardiac CT score 0 on 2-3-21    Had all labs in Feb 2022---note low D--start OTC  A1C 5.6%  Home bp 130/80.  Up today d/t pain      Need pre op clearance to hip right tear repair 8-24-22 DR Watson    The following portions of the patient's history were reviewed and updated as appropriate: allergies, current medications, past family history, past medical history, past social history, past surgical history and problem list.    Review of Systems    Objective   Physical Exam  Vitals and nursing note reviewed.   Constitutional:       General: She is not in acute distress.     Appearance: She is well-developed. She is not ill-appearing or toxic-appearing.   HENT:      Head: Normocephalic.      Right Ear: External ear normal.      Left Ear: External ear normal.      Nose: Nose normal.      Mouth/Throat:      Pharynx: Oropharynx is clear.   Eyes:      General: No scleral icterus.     Conjunctiva/sclera: Conjunctivae normal.      Pupils: Pupils are equal, round, and reactive to light.   Neck:      Thyroid: No thyromegaly.   Cardiovascular:      Rate and Rhythm: Normal rate and regular rhythm.      Heart sounds: Normal heart sounds. No murmur heard.  Pulmonary:      Effort: Pulmonary effort is normal. No respiratory distress.      Breath sounds: No rales.   Musculoskeletal:         General: No deformity. Normal range of motion.      Cervical back: Normal range of motion and neck supple.      Right lower leg: No edema.      Left  lower leg: No edema.   Skin:     General: Skin is warm and dry.      Findings: No rash.   Neurological:      General: No focal deficit present.      Mental Status: She is alert and oriented to person, place, and time. Mental status is at baseline.   Psychiatric:         Mood and Affect: Mood normal.         Behavior: Behavior normal.         Thought Content: Thought content normal.         Judgment: Judgment normal.         Assessment & Plan   Diagnoses and all orders for this visit:    1. Essential hypertension (Primary)  -     Comprehensive metabolic panel; Future  -     Lipid panel; Future  -     CBC and Differential; Future  -     TSH; Future  -     Hemoglobin A1c; Future  -     Urinalysis With Microscopic - Urine, Clean Catch; Future  -     T3, Free; Future  -     T4, Free; Future  -     Vitamin D 25 Hydroxy; Future  -     Vitamin B12; Future  -     Folate; Future    2. Low serum vitamin B12  -     Comprehensive metabolic panel; Future  -     Lipid panel; Future  -     CBC and Differential; Future  -     TSH; Future  -     Hemoglobin A1c; Future  -     Urinalysis With Microscopic - Urine, Clean Catch; Future  -     T3, Free; Future  -     T4, Free; Future  -     Vitamin D 25 Hydroxy; Future  -     Vitamin B12; Future  -     Folate; Future    3. Impaired fasting glucose  -     Comprehensive metabolic panel; Future  -     Lipid panel; Future  -     CBC and Differential; Future  -     TSH; Future  -     Hemoglobin A1c; Future  -     Urinalysis With Microscopic - Urine, Clean Catch; Future  -     T3, Free; Future  -     T4, Free; Future  -     Vitamin D 25 Hydroxy; Future  -     Vitamin B12; Future  -     Folate; Future    4. Hyperlipidemia, mixed  -     Comprehensive metabolic panel; Future  -     Lipid panel; Future  -     CBC and Differential; Future  -     TSH; Future  -     Hemoglobin A1c; Future  -     Urinalysis With Microscopic - Urine, Clean Catch; Future  -     T3, Free; Future  -     T4, Free; Future  -      Vitamin D 25 Hydroxy; Future  -     Vitamin B12; Future  -     Folate; Future    5. Vitamin D deficiency  -     Comprehensive metabolic panel; Future  -     Lipid panel; Future  -     CBC and Differential; Future  -     TSH; Future  -     Hemoglobin A1c; Future  -     Urinalysis With Microscopic - Urine, Clean Catch; Future  -     T3, Free; Future  -     T4, Free; Future  -     Vitamin D 25 Hydroxy; Future  -     Vitamin B12; Future  -     Folate; Future    6. Pre-operative clearance  -     Comprehensive metabolic panel; Future  -     Lipid panel; Future  -     CBC and Differential; Future  -     TSH; Future  -     Hemoglobin A1c; Future  -     Urinalysis With Microscopic - Urine, Clean Catch; Future  -     T3, Free; Future  -     T4, Free; Future  -     Vitamin D 25 Hydroxy; Future  -     Vitamin B12; Future  -     Folate; Future    Other orders  -     Cholecalciferol 50 MCG (2000 UT) capsule; One PO daily  Dispense: 90 each; Refill: 3  -     hydroCHLOROthiazide (MICROZIDE) 12.5 MG capsule; Take 1 capsule by mouth Daily. For BP  Dispense: 90 capsule; Refill: 1  -     valsartan (DIOVAN) 320 MG tablet; Take 1 tablet by mouth Daily. For BP with HCTZ  Dispense: 90 tablet; Refill: 1      Labs show low Vitamin D levels.  I want you to add OTC Vitamin D 2,000 I.U. Daily.  Neuro has her on B12  Labs for me Feb Intol statins  Plan, More Payne, was seen today.  she was seen for HTN and continue medication, Imparied fasting glucose and plan follow up labs, diet, and exercise, Hyperlipidemia and will continue current medication and Vitamin D deficiency and will update labs .  Confirm labs and EKG----pre op--plan to clear

## 2022-07-19 ENCOUNTER — HOSPITAL ENCOUNTER (OUTPATIENT)
Dept: MRI IMAGING | Facility: HOSPITAL | Age: 47
Discharge: HOME | End: 2022-07-19

## 2022-07-19 ENCOUNTER — HOSPITAL ENCOUNTER (OUTPATIENT)
Dept: GENERAL RADIOLOGY | Facility: HOSPITAL | Age: 47
Discharge: HOME | End: 2022-07-19

## 2022-07-19 DIAGNOSIS — M25.552 LEFT HIP PAIN: ICD-10-CM

## 2022-07-19 PROCEDURE — 25010000002 IOPAMIDOL 61 % SOLUTION: Performed by: SPECIALIST

## 2022-07-19 PROCEDURE — A9577 INJ MULTIHANCE: HCPCS | Performed by: SPECIALIST

## 2022-07-19 PROCEDURE — 77002 NEEDLE LOCALIZATION BY XRAY: CPT

## 2022-07-19 PROCEDURE — 0 GADOBENATE DIMEGLUMINE 529 MG/ML SOLUTION: Performed by: SPECIALIST

## 2022-07-19 PROCEDURE — 73722 MRI JOINT OF LWR EXTR W/DYE: CPT

## 2022-07-19 PROCEDURE — 25010000002 METHYLPREDNISOLONE PER 40 MG: Performed by: SPECIALIST

## 2022-07-19 PROCEDURE — 0 LIDOCAINE 1 % SOLUTION: Performed by: SPECIALIST

## 2022-07-19 RX ORDER — BUPIVACAINE HYDROCHLORIDE 2.5 MG/ML
10 INJECTION, SOLUTION EPIDURAL; INFILTRATION; INTRACAUDAL ONCE
Status: COMPLETED | OUTPATIENT
Start: 2022-07-19 | End: 2022-07-19

## 2022-07-19 RX ORDER — METHYLPREDNISOLONE SODIUM SUCCINATE 40 MG/ML
40 INJECTION, POWDER, LYOPHILIZED, FOR SOLUTION INTRAMUSCULAR; INTRAVENOUS
Status: COMPLETED | OUTPATIENT
Start: 2022-07-19 | End: 2022-07-19

## 2022-07-19 RX ORDER — LIDOCAINE HYDROCHLORIDE 10 MG/ML
10 INJECTION, SOLUTION INFILTRATION; PERINEURAL ONCE
Status: COMPLETED | OUTPATIENT
Start: 2022-07-19 | End: 2022-07-19

## 2022-07-19 RX ADMIN — METHYLPREDNISOLONE SODIUM SUCCINATE 40 MG: 40 INJECTION, POWDER, LYOPHILIZED, FOR SOLUTION INTRAMUSCULAR; INTRAVENOUS at 07:51

## 2022-07-19 RX ADMIN — BUPIVACAINE HYDROCHLORIDE 5 ML: 2.5 INJECTION, SOLUTION EPIDURAL; INFILTRATION; INTRACAUDAL; PERINEURAL at 07:51

## 2022-07-19 RX ADMIN — IOPAMIDOL 7 ML: 612 INJECTION, SOLUTION INTRATHECAL at 07:51

## 2022-07-19 RX ADMIN — LIDOCAINE HYDROCHLORIDE 4 ML: 10 INJECTION, SOLUTION INFILTRATION; PERINEURAL at 07:51

## 2022-07-19 RX ADMIN — GADOBENATE DIMEGLUMINE 0.05 ML: 529 INJECTION, SOLUTION INTRAVENOUS at 07:51

## 2022-07-27 ENCOUNTER — OFFICE VISIT (OUTPATIENT)
Dept: NEUROLOGY | Facility: CLINIC | Age: 47
End: 2022-07-27

## 2022-07-27 VITALS
SYSTOLIC BLOOD PRESSURE: 136 MMHG | BODY MASS INDEX: 25.91 KG/M2 | HEART RATE: 65 BPM | WEIGHT: 171 LBS | OXYGEN SATURATION: 98 % | DIASTOLIC BLOOD PRESSURE: 88 MMHG | HEIGHT: 68 IN

## 2022-07-27 DIAGNOSIS — R42 DIZZINESS: ICD-10-CM

## 2022-07-27 DIAGNOSIS — G43.809 VESTIBULAR MIGRAINE: ICD-10-CM

## 2022-07-27 DIAGNOSIS — E53.8 LOW SERUM VITAMIN B12: Primary | ICD-10-CM

## 2022-07-27 DIAGNOSIS — Z86.69 HISTORY OF MIGRAINE HEADACHES: ICD-10-CM

## 2022-07-27 PROBLEM — M70.61 TROCHANTERIC BURSITIS OF RIGHT HIP: Status: ACTIVE | Noted: 2022-07-27

## 2022-07-27 PROBLEM — M25.551 RIGHT HIP PAIN: Status: ACTIVE | Noted: 2022-03-09

## 2022-07-27 PROBLEM — M70.62 TROCHANTERIC BURSITIS OF LEFT HIP: Status: ACTIVE | Noted: 2022-07-27

## 2022-07-27 PROBLEM — M47.816 LUMBAR SPONDYLOSIS: Status: ACTIVE | Noted: 2022-07-27

## 2022-07-27 PROBLEM — Z79.891 LONG TERM (CURRENT) USE OF OPIATE ANALGESIC: Status: ACTIVE | Noted: 2022-07-27

## 2022-07-27 PROBLEM — M16.10 PRIMARY LOCALIZED OSTEOARTHRITIS OF PELVIC REGION AND THIGH: Status: ACTIVE | Noted: 2022-07-27

## 2022-07-27 PROCEDURE — 99214 OFFICE O/P EST MOD 30 MIN: CPT | Performed by: PSYCHIATRY & NEUROLOGY

## 2022-07-27 RX ORDER — OXYCODONE AND ACETAMINOPHEN 10; 325 MG/1; MG/1
TABLET ORAL
COMMUNITY
Start: 2022-07-26

## 2022-07-27 NOTE — PROGRESS NOTES
Chief Complaint   Patient presents with   • Dizziness       Patient ID: More Payne is a 47 y.o. female.    HPI: I have had the pleasure of seeing your patient again today.  As you may know she is a 47-year-old female here for the management of dizziness and migraine headache.  She has been seen by the folks at the [facility].  They did find some mild vestibular dysfunction however not over the significant.  They did recommend some VOR therapy which she has not started as yet.  Also, she has experienced some migrainous symptoms prior to the onset of vertigo.  She says that she will have flashing lights in her visual fields followed by dizziness.  She says that the last episode lasted for about 2 hours.  She denies any new onset focal weakness or numbness of her arms or legs.  She does have history of vitamin B12 deficiency and is currently taking vitamin B12 injections.    The following portions of the patient's history were reviewed and updated as appropriate: allergies, current medications, past family history, past medical history, past social history, past surgical history and problem list.    Review of Systems   Neurological: Positive for dizziness and headaches. Negative for tremors, seizures, syncope, speech difficulty, weakness, light-headedness and numbness.   Hematological: Does not bruise/bleed easily.   Psychiatric/Behavioral: Positive for sleep disturbance. Negative for agitation, behavioral problems, confusion, decreased concentration, hallucinations, self-injury and suicidal ideas. The patient is nervous/anxious.       I have reviewed the review of systems above performed by my medical assistant.      Vitals:    07/27/22 1258   BP: 136/88   Pulse: 65   SpO2: 98%       Neurologic Exam     Mental Status   Oriented to person, place, and time.   Concentration: normal.   Level of consciousness: alert  Knowledge: consistent with education (No deficits found.).     Cranial Nerves     CN II    Visual fields full to confrontation.     CN III, IV, VI   Pupils are equal, round, and reactive to light.  Extraocular motions are normal.   CN III: no CN III palsy  CN VI: no CN VI palsy    CN V   Facial sensation intact.     CN VII   Facial expression full, symmetric.     CN VIII   CN VIII normal.     CN IX, X   CN IX normal.   CN X normal.     CN XI   CN XI normal.     CN XII   CN XII normal.     Motor Exam     Strength   Right neck flexion: 5/5  Left neck flexion: 5/5  Right neck extension: 5/5  Left neck extension: 5/5  Right deltoid: 5/5  Left deltoid: 5/5  Right biceps: 5/5  Left biceps: 5/5  Right triceps: 5/5  Left triceps: 5/5  Right wrist flexion: 5/5  Left wrist flexion: 5/5  Right wrist extension: 5/5  Left wrist extension: 5/5  Right interossei: 5/5  Left interossei: 5/5  Right abdominals: 5/5  Left abdominals: 5/5  Right iliopsoas: 5/5  Left iliopsoas: 5/5  Right quadriceps: 5/5  Left quadriceps: 5/5  Right hamstrin/5  Left hamstrin/5  Right glutei: 5/5  Left glutei: 5/5  Right anterior tibial: 5/5  Left anterior tibial: 5/5  Right posterior tibial: 5/5  Left posterior tibial: 5/5  Right peroneal: 5/5  Left peroneal: 5/5  Right gastroc: 5/5  Left gastroc: 5/5    Sensory Exam   Light touch normal.   Vibration normal.     Gait, Coordination, and Reflexes     Gait  Gait: normal    Reflexes   Right brachioradialis: 2+  Left brachioradialis: 2+  Right biceps: 2+  Left biceps: 2+  Right triceps: 2+  Left triceps: 2+  Right patellar: 2+  Left patellar: 2+  Right achilles: 2+  Left achilles: 2+  Right : 2+  Left : 2+Station is normal.       Physical Exam  Vitals reviewed.   Constitutional:       Appearance: She is well-developed.   HENT:      Head: Normocephalic and atraumatic.   Eyes:      Extraocular Movements: EOM normal.      Pupils: Pupils are equal, round, and reactive to light.   Cardiovascular:      Rate and Rhythm: Normal rate and regular rhythm.   Pulmonary:      Breath sounds:  Normal breath sounds.   Musculoskeletal:         General: Normal range of motion.   Skin:     General: Skin is warm.   Neurological:      Mental Status: She is oriented to person, place, and time.      Gait: Gait is intact.      Deep Tendon Reflexes:      Reflex Scores:       Tricep reflexes are 2+ on the right side and 2+ on the left side.       Bicep reflexes are 2+ on the right side and 2+ on the left side.       Brachioradialis reflexes are 2+ on the right side and 2+ on the left side.       Patellar reflexes are 2+ on the right side and 2+ on the left side.       Achilles reflexes are 2+ on the right side and 2+ on the left side.        Procedures    Assessment/Plan: We did talk about the vitamin B12 protocol.  She will have her B12 level checked in approximately 1 month.  Also I have given her samples of Ubrelvy as well as Nurtec to try as abortive therapy for her migraines.  I did advise her that since we are considering vestibular migraine as an etiology for her dizziness to refrain from triptans for now.  We will see her back in 4 months or sooner if needed.  A total of 30 minutes was spent face-to-face with the patient today.  Of that greater than 50% of this time was spent discussing signs and symptoms of vestibular migraine, dizziness, B12 deficiency, patient education, plan of care and prognosis.       Diagnoses and all orders for this visit:    1. Low serum vitamin B12 (Primary)  -     Vitamin B12    2. Dizziness    3. History of migraine headaches    4. Vestibular migraine           Vincent Gilman II, MD

## 2022-09-22 NOTE — TELEPHONE ENCOUNTER
Caller: TAWANA Choi call back number: [phone]    Requested Prescriptions:   UBRELVY     Requested Prescriptions      No prescriptions requested or ordered in this encounter        Pharmacy where request should be sent:  [facility]/pharmacy #[id number] - [place], [place] - [address] [place] [address] AT [facility] - [phone] PH - [phone] FX  [phone]    Additional details provided by patient: PT WAS GIVEN SAMPLES FROM DR GORMAN TO TRY NURTEC AND UBRELVY . THE UBRELVY WORKED AND SHE WOULD LIKE A RX CALLED IN FOR THAT. SHE WOULD LIKE SAMPLES IF YOU CAN'T  CALL THIS IN TODAY. PLEASE CALL TO ADVISE IF YOU HAVE SAMPLES     Does the patient have less than a 3 day supply:  [x] Yes  [] No      PLEASE ADVISE   Shanon Fair   09/22/22 14:01 EDT

## 2022-11-08 ENCOUNTER — OFFICE VISIT (OUTPATIENT)
Dept: FAMILY MEDICINE CLINIC | Facility: CLINIC | Age: 47
End: 2022-11-08

## 2022-11-08 VITALS
HEIGHT: 68 IN | HEART RATE: 69 BPM | SYSTOLIC BLOOD PRESSURE: 110 MMHG | DIASTOLIC BLOOD PRESSURE: 76 MMHG | RESPIRATION RATE: 16 BRPM | BODY MASS INDEX: 27.58 KG/M2 | WEIGHT: 182 LBS | TEMPERATURE: 97.5 F | OXYGEN SATURATION: 100 %

## 2022-11-08 DIAGNOSIS — G43.809 VESTIBULAR MIGRAINE: ICD-10-CM

## 2022-11-08 DIAGNOSIS — Q07.8 PERIVENTRICULAR HETEROTOPIA: ICD-10-CM

## 2022-11-08 DIAGNOSIS — E53.8 LOW SERUM VITAMIN B12: Primary | ICD-10-CM

## 2022-11-08 DIAGNOSIS — I10 ESSENTIAL HYPERTENSION: ICD-10-CM

## 2022-11-08 PROBLEM — M25.552 LEFT HIP PAIN: Status: ACTIVE | Noted: 2022-11-07

## 2022-11-08 PROCEDURE — 99214 OFFICE O/P EST MOD 30 MIN: CPT | Performed by: PHYSICIAN ASSISTANT

## 2022-11-08 RX ORDER — ONDANSETRON 4 MG/1
4 TABLET, ORALLY DISINTEGRATING ORAL EVERY 8 HOURS PRN
Qty: 60 TABLET | Refills: 11 | Status: SHIPPED | OUTPATIENT
Start: 2022-11-08

## 2022-11-08 NOTE — PROGRESS NOTES
Subjective   More Payne is a 47 y.o. female.     History of Present Illness       To have left hip labral tear repair-----he did right up 8-17-22 and now to do left  Taking Naprosyn ---yeast infx---needs GYN Diflucan regimen    Did see rheumatologist  Had work up chest pain 3-9-22 cardio eval for bp  SI pain ---sees pain management    Sees neurologist for migraine ----Dr Vincent Gilman MD  And has vestibular migraine  Procedures     Assessment/Plan: We did talk about the vitamin B12 protocol.  She will have her B12 level checked in approximately 1 month.  Also I have given her samples of Ubrelvy as well as Nurtec to try as abortive therapy for her migraines.  I did advise her that since we are considering vestibular migraine as an etiology for her dizziness to refrain from triptans for now.  We will see her back in 4 months or sooner if needed.  A total of 30 minutes was spent face-to-face with the patient today.  Of that greater than 50% of this time was spent discussing signs and symptoms of vestibular migraine, dizziness, B12 deficiency, patient education, plan of care and prognosis.  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 and small right temporal flair signal-----watching this for possible dysplasia versus low-grade glioma    preop EKG 11-4-22  Procedure: ELECTROCARDIOGRAM RESULT   Heart Rate     62   P-R Interval     136 ms   QRS Interval     82 ms   QT Interval     412 ms   QTC Interval     419 ms   P Axis     48 deg   QRS Axis     69 deg   T Wave Axis     65 deg   DATE: 11/04/2022 12:53   SINUS RHYTHM   Summary: Normal ECG     Had normal BMP and CBC  The following portions of the patient's history were reviewed and updated as appropriate: allergies, current medications, past family history, past medical history,  past social history, past surgical history and problem list.    Review of Systems   Constitutional: Positive for activity change and appetite change. Negative for fever.   HENT: Negative for nosebleeds and trouble swallowing.    Eyes: Negative for visual disturbance.   Respiratory: Negative for choking and stridor.    Cardiovascular: Negative for chest pain.   Gastrointestinal: Negative for blood in stool.   Endocrine: Negative for polydipsia.   Genitourinary: Negative for genital sores and hematuria.   Musculoskeletal: Positive for arthralgias.   Skin: Negative for color change and rash.   Allergic/Immunologic: Negative for immunocompromised state.   Neurological: Negative for seizures, facial asymmetry and speech difficulty.   Hematological: Negative for adenopathy.   Psychiatric/Behavioral: Negative for behavioral problems, self-injury and suicidal ideas.       Objective   Physical Exam  Vitals and nursing note reviewed.   Constitutional:       General: She is not in acute distress.     Appearance: Normal appearance. She is well-developed. She is not ill-appearing or toxic-appearing.   HENT:      Head: Normocephalic.      Right Ear: External ear normal.      Left Ear: External ear normal.      Nose: Nose normal.      Mouth/Throat:      Pharynx: Oropharynx is clear.   Eyes:      General: No scleral icterus.     Conjunctiva/sclera: Conjunctivae normal.      Pupils: Pupils are equal, round, and reactive to light.   Neck:      Thyroid: No thyromegaly.   Cardiovascular:      Rate and Rhythm: Normal rate and regular rhythm.      Heart sounds: Normal heart sounds. No murmur heard.  Pulmonary:      Effort: Pulmonary effort is normal. No respiratory distress.      Breath sounds: No rales.   Musculoskeletal:         General: No deformity. Normal range of motion.      Cervical back: Normal range of motion and neck supple.      Right lower leg: No edema.      Left lower leg: No edema.   Skin:     General: Skin is warm and  dry.      Findings: No rash.   Neurological:      General: No focal deficit present.      Mental Status: She is alert and oriented to person, place, and time. Mental status is at baseline.   Psychiatric:         Mood and Affect: Mood normal.         Behavior: Behavior normal.         Thought Content: Thought content normal.         Judgment: Judgment normal.         Assessment & Plan   Diagnoses and all orders for this visit:    1. Low serum vitamin B12 (Primary)  -     Vitamin B12    Other orders  -     ondansetron ODT (Zofran ODT) 4 MG disintegrating tablet; Place 1 tablet on the tongue Every 8 (Eight) Hours As Needed for Nausea or Vomiting.  Dispense: 60 tablet; Refill: 11      Note patient seeing Dr. Vincent Gilman neurologist for migraine and vestibular migraine and is on Ubrevly  Has seen DR Lemus and is + HLA B12  8-24-22;  F/u 6 mos  Sees pain management ---Layton Eber  Have labs ordered Feb  Ok get B12 level today; off B12 shots  Plan, More NEO Nietoey, was seen today.  she was seen for HTN and continue medication.  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 Surgical clearance needed.  Have you had these symptoms before?: No  How long have you been having these symptoms?: 1-4 days  What is the primary reason for your visit?: Other

## 2022-11-09 LAB — VIT B12 SERPL-MCNC: 798 PG/ML (ref 211–946)

## 2022-11-29 ENCOUNTER — OFFICE VISIT (OUTPATIENT)
Dept: NEUROLOGY | Facility: CLINIC | Age: 47
End: 2022-11-29

## 2022-11-29 VITALS
HEART RATE: 86 BPM | OXYGEN SATURATION: 97 % | BODY MASS INDEX: 27.43 KG/M2 | WEIGHT: 181 LBS | DIASTOLIC BLOOD PRESSURE: 76 MMHG | HEIGHT: 68 IN | SYSTOLIC BLOOD PRESSURE: 110 MMHG

## 2022-11-29 DIAGNOSIS — Z86.69 HISTORY OF MIGRAINE HEADACHES: Primary | ICD-10-CM

## 2022-11-29 DIAGNOSIS — G43.809 VESTIBULAR MIGRAINE: ICD-10-CM

## 2022-11-29 PROCEDURE — 99213 OFFICE O/P EST LOW 20 MIN: CPT | Performed by: PSYCHIATRY & NEUROLOGY

## 2022-11-29 RX ORDER — FLUCONAZOLE 150 MG/1
TABLET ORAL
COMMUNITY
Start: 2022-11-18

## 2022-11-29 RX ORDER — NAPROXEN 500 MG/1
500 TABLET ORAL 2 TIMES DAILY WITH MEALS
COMMUNITY
Start: 2022-11-11

## 2022-11-29 RX ORDER — VERAPAMIL HYDROCHLORIDE 80 MG/1
80 TABLET ORAL 2 TIMES DAILY
Qty: 60 TABLET | Refills: 4 | Status: SHIPPED | OUTPATIENT
Start: 2022-11-29 | End: 2022-12-29

## 2022-11-29 NOTE — PROGRESS NOTES
Chief Complaint   Patient presents with   • Dizziness   • Migraine       Patient ID: More Payne is a 47 y.o. female.    HPI:  At the pleasure of seeing your patient again today.  As you may know she is a 47-year-old female here for management of vestibular migraine.  She has been to the [facility].  They were unable to identify any particular pathology.  She does have migraines approximately 9 days/month currently.  She uses Ubrelvy as abortive therapy which is helpful.  She denies any new symptoms neurologically.    The following portions of the patient's history were reviewed and updated as appropriate: allergies, current medications, past family history, past medical history, past social history, past surgical history and problem list.    Review of Systems   Neurological: Positive for dizziness, light-headedness, numbness and headaches. Negative for tremors, seizures, syncope, speech difficulty and weakness.   Hematological: Does not bruise/bleed easily.   Psychiatric/Behavioral: Positive for confusion, decreased concentration and sleep disturbance. Negative for agitation, behavioral problems, hallucinations, self-injury and suicidal ideas. The patient is nervous/anxious.       I have reviewed the review of systems above performed by my medical assistant.      Vitals:    11/29/22 1416   BP: 110/76   Pulse: 86   SpO2: 97%       Neurologic Exam     Mental Status   Oriented to person, place, and time.   Concentration: normal.   Level of consciousness: alert  Knowledge: consistent with education (No deficits found.).     Cranial Nerves     CN II   Visual fields full to confrontation.     CN III, IV, VI   Pupils are equal, round, and reactive to light.  Extraocular motions are normal.   CN III: no CN III palsy  CN VI: no CN VI palsy    CN V   Facial sensation intact.     CN VII   Facial expression full, symmetric.     CN VIII   CN VIII normal.     CN IX, X   CN IX normal.   CN X normal.     CN XI    CN XI normal.     CN XII   CN XII normal.     Motor Exam     Strength   Right neck flexion: 5/5  Left neck flexion: 5/5  Right neck extension: 5/5  Left neck extension: 5/5  Right deltoid: 5/5  Left deltoid: 5/5  Right biceps: 5/5  Left biceps: 5/5  Right triceps: 5/5  Left triceps: 5/5  Right wrist flexion: 5/5  Left wrist flexion: 5/5  Right wrist extension: 5/5  Left wrist extension: 5/5  Right interossei: 5/5  Left interossei: 5/5  Right abdominals: 5/5  Left abdominals: 5/5  Right iliopsoas: 5/5  Left iliopsoas: 5/5  Right quadriceps: 5/5  Left quadriceps: 5/5  Right hamstrin/5  Left hamstrin/5  Right glutei: 5/5  Left glutei: 5/5  Right anterior tibial: 5/5  Left anterior tibial: 5/5  Right posterior tibial: 5/5  Left posterior tibial: 5/5  Right peroneal: 5/5  Left peroneal: 5/5  Right gastroc: 5/5  Left gastroc: 5/5    Sensory Exam   Light touch normal.   Vibration normal.     Gait, Coordination, and Reflexes     Gait  Gait: normal    Reflexes   Right brachioradialis: 2+  Left brachioradialis: 2+  Right biceps: 2+  Left biceps: 2+  Right triceps: 2+  Left triceps: 2+  Right patellar: 2+  Left patellar: 2+  Right achilles: 2+  Left achilles: 2+  Right : 2+  Left : 2+Station is normal.       Physical Exam  Vitals reviewed.   Constitutional:       Appearance: She is well-developed.   HENT:      Head: Normocephalic and atraumatic.   Eyes:      Extraocular Movements: EOM normal.      Pupils: Pupils are equal, round, and reactive to light.   Cardiovascular:      Rate and Rhythm: Normal rate and regular rhythm.   Pulmonary:      Breath sounds: Normal breath sounds.   Musculoskeletal:         General: Normal range of motion.   Skin:     General: Skin is warm.   Neurological:      Mental Status: She is oriented to person, place, and time.      Gait: Gait is intact.      Deep Tendon Reflexes:      Reflex Scores:       Tricep reflexes are 2+ on the right side and 2+ on the left side.       Bicep  reflexes are 2+ on the right side and 2+ on the left side.       Brachioradialis reflexes are 2+ on the right side and 2+ on the left side.       Patellar reflexes are 2+ on the right side and 2+ on the left side.       Achilles reflexes are 2+ on the right side and 2+ on the left side.        Procedures    Assessment/Plan: We are going to try verapamil 80 mg twice daily.  She is on an antihypertensive already therefore she will talk with her primary care physician prior to starting this.  Otherwise she will continue the Ubrelvy abortively and see us back in 4 months or sooner if needed       Diagnoses and all orders for this visit:    1. History of migraine headaches (Primary)  -     verapamil (CALAN) 80 MG tablet; Take 1 tablet by mouth 2 (Two) Times a Day for 30 days.  Dispense: 60 tablet; Refill: 4    2. Vestibular migraine  -     verapamil (CALAN) 80 MG tablet; Take 1 tablet by mouth 2 (Two) Times a Day for 30 days.  Dispense: 60 tablet; Refill: 4           Vincent Gilman II, MD

## 2023-01-18 RX ORDER — VALSARTAN 320 MG/1
TABLET ORAL
Qty: 90 TABLET | Refills: 0 | Status: SHIPPED | OUTPATIENT
Start: 2023-01-18

## 2023-01-26 RX ORDER — HYDROCHLOROTHIAZIDE 12.5 MG/1
CAPSULE, GELATIN COATED ORAL
Qty: 90 CAPSULE | Refills: 0 | Status: SHIPPED | OUTPATIENT
Start: 2023-01-26

## 2023-03-28 ENCOUNTER — OFFICE VISIT (OUTPATIENT)
Dept: NEUROLOGY | Facility: CLINIC | Age: 48
End: 2023-03-28
Payer: COMMERCIAL

## 2023-03-28 VITALS
BODY MASS INDEX: 27.52 KG/M2 | HEIGHT: 68 IN | HEART RATE: 55 BPM | SYSTOLIC BLOOD PRESSURE: 118 MMHG | OXYGEN SATURATION: 99 % | DIASTOLIC BLOOD PRESSURE: 68 MMHG

## 2023-03-28 DIAGNOSIS — G43.809 VESTIBULAR MIGRAINE: ICD-10-CM

## 2023-03-28 DIAGNOSIS — Z86.69 HISTORY OF MIGRAINE HEADACHES: Primary | ICD-10-CM

## 2023-03-28 PROCEDURE — 99213 OFFICE O/P EST LOW 20 MIN: CPT | Performed by: PSYCHIATRY & NEUROLOGY

## 2023-03-28 NOTE — PROGRESS NOTES
Chief Complaint   Patient presents with   • Migraine       Patient ID: More Payne is a 47 y.o. female.    HPI: I had the pleasure of seeing your patient today.  As you may know she is a 47-year-old female here for the management of migraine headaches.  She seems to be doing well.  She says that within the last 30 days she has only had 4 to 5 days of headaches.  1 of these was a migrainous type headache.  She does use Ubrelvy versus naproxen abortively.  She will also take Zofran for nausea.  She is now taking the verapamil preventatively.  She has not had any issues with that medication.  No side effects.  No new symptoms neurologically.  No new headache symptoms.    The following portions of the patient's history were reviewed and updated as appropriate: allergies, current medications, past family history, past medical history, past social history, past surgical history and problem list.    Review of Systems   Eyes: Positive for photophobia and visual disturbance. Negative for redness.   Gastrointestinal: Positive for nausea. Negative for constipation, diarrhea and vomiting.   Endocrine: Negative for cold intolerance, heat intolerance and polydipsia.   Musculoskeletal: Positive for neck pain and neck stiffness. Negative for back pain.   Allergic/Immunologic: Negative for environmental allergies, food allergies and immunocompromised state.   Neurological: Positive for dizziness and headaches. Negative for tremors, seizures, syncope, facial asymmetry, speech difficulty, weakness, light-headedness and numbness.   Hematological: Negative for adenopathy. Does not bruise/bleed easily.      I have reviewed the review of systems above performed by my medical assistant.      Vitals:    03/28/23 1558   BP: 118/68   Pulse: 55   SpO2: 99%       Neurologic Exam     Mental Status   Oriented to person, place, and time.   Concentration: normal.   Level of consciousness: alert  Knowledge: consistent with education (No  deficits found.).     Cranial Nerves     CN II   Visual fields full to confrontation.     CN III, IV, VI   Pupils are equal, round, and reactive to light.  Extraocular motions are normal.   CN III: no CN III palsy  CN VI: no CN VI palsy    CN V   Facial sensation intact.     CN VII   Facial expression full, symmetric.     CN VIII   CN VIII normal.     CN IX, X   CN IX normal.   CN X normal.     CN XI   CN XI normal.     CN XII   CN XII normal.     Motor Exam     Strength   Right neck flexion: 5/5  Left neck flexion: 5/5  Right neck extension: 5/5  Left neck extension: 5/5  Right deltoid: 5/5  Left deltoid: 5/5  Right biceps: 5/5  Left biceps: 5/5  Right triceps: 5/5  Left triceps: 5/5  Right wrist flexion: 5/5  Left wrist flexion: 5/5  Right wrist extension: 5/5  Left wrist extension: 5/5  Right interossei: 5/5  Left interossei: 5/5  Right abdominals: 5/5  Left abdominals: 5/5  Right iliopsoas: 5/5  Left iliopsoas: 5/5  Right quadriceps: 5/5  Left quadriceps: 5/5  Right hamstrin/5  Left hamstrin/5  Right glutei: 5/5  Left glutei: 5/5  Right anterior tibial: 5/5  Left anterior tibial: 5/5  Right posterior tibial: 5/5  Left posterior tibial: 5/5  Right peroneal: 5/5  Left peroneal: 5/5  Right gastroc: 5/5  Left gastroc: 5/5    Sensory Exam   Light touch normal.   Vibration normal.     Gait, Coordination, and Reflexes     Gait  Gait: normal    Reflexes   Right brachioradialis: 2+  Left brachioradialis: 2+  Right biceps: 2+  Left biceps: 2+  Right triceps: 2+  Left triceps: 2+  Right patellar: 2+  Left patellar: 2+  Right achilles: 2+  Left achilles: 2+  Right : 2+  Left : 2+Station is normal.       Physical Exam  Vitals reviewed.   Constitutional:       Appearance: She is well-developed.   HENT:      Head: Normocephalic and atraumatic.   Eyes:      Extraocular Movements: EOM normal.      Pupils: Pupils are equal, round, and reactive to light.   Cardiovascular:      Rate and Rhythm: Normal rate and  regular rhythm.   Pulmonary:      Breath sounds: Normal breath sounds.   Musculoskeletal:         General: Normal range of motion.   Skin:     General: Skin is warm.   Neurological:      Mental Status: She is oriented to person, place, and time.      Gait: Gait is intact.      Deep Tendon Reflexes:      Reflex Scores:       Tricep reflexes are 2+ on the right side and 2+ on the left side.       Bicep reflexes are 2+ on the right side and 2+ on the left side.       Brachioradialis reflexes are 2+ on the right side and 2+ on the left side.       Patellar reflexes are 2+ on the right side and 2+ on the left side.       Achilles reflexes are 2+ on the right side and 2+ on the left side.        Procedures    Assessment/Plan:    Our plan is to continue her current medication regimen using the verapamil preventatively along with Ubrelvy versus naproxen abortively.  We will see her back in 6 months or sooner if needed.  A total of 20 minutes was spent face-to-face with the patient today.  Of that greater than 50% of this time was spent discussing signs and symptoms of vestibular migraine, migraine headaches, patient education, plan of care and prognosis.     Diagnoses and all orders for this visit:    1. History of migraine headaches (Primary)    2. Vestibular migraine           Vincent Gilman II, MD

## 2023-04-21 RX ORDER — HYDROCHLOROTHIAZIDE 12.5 MG/1
CAPSULE, GELATIN COATED ORAL
Qty: 30 CAPSULE | Refills: 0 | Status: SHIPPED | OUTPATIENT
Start: 2023-04-21

## 2023-04-21 NOTE — TELEPHONE ENCOUNTER
Rx Refill Note  Requested Prescriptions     Pending Prescriptions Disp Refills   • hydroCHLOROthiazide (MICROZIDE) 12.5 MG capsule [Pharmacy Med Name: HYDROCHLOROTHIAZIDE 12.5 MG CP] 30 capsule 0     Sig: TAKE 1 CAPSULE BY MOUTH DAILY. FOR BLOOD PRESSURE      Last office visit with prescribing clinician: 11/8/2022   Last telemedicine visit with prescribing clinician: Visit date not found   Next office visit with prescribing clinician: Visit date not found                         Would you like a call back once the refill request has been completed: [] Yes [] No    If the office needs to give you a call back, can they leave a voicemail: [] Yes [] No    Ivis Stokes MA       04/21/23, 16:17 EDT

## 2023-08-14 RX ORDER — UBROGEPANT 100 MG/1
TABLET ORAL
Qty: 10 TABLET | Refills: 5 | Status: SHIPPED | OUTPATIENT
Start: 2023-08-14

## 2023-08-28 ENCOUNTER — SPECIALTY PHARMACY (OUTPATIENT)
Dept: NEUROLOGY | Facility: CLINIC | Age: 48
End: 2023-08-28
Payer: COMMERCIAL

## 2023-08-30 ENCOUNTER — SPECIALTY PHARMACY (OUTPATIENT)
Dept: NEUROLOGY | Facility: CLINIC | Age: 48
End: 2023-08-30
Payer: COMMERCIAL

## 2023-10-02 ENCOUNTER — TELEPHONE (OUTPATIENT)
Dept: NEUROLOGY | Facility: CLINIC | Age: 48
End: 2023-10-02
Payer: COMMERCIAL

## 2023-10-09 ENCOUNTER — SPECIALTY PHARMACY (OUTPATIENT)
Dept: NEUROLOGY | Facility: CLINIC | Age: 48
End: 2023-10-09
Payer: COMMERCIAL

## 2023-11-09 RX ORDER — ONDANSETRON 4 MG/1
TABLET, ORALLY DISINTEGRATING ORAL
Qty: 12 TABLET | Status: SHIPPED | OUTPATIENT
Start: 2023-11-09

## 2024-04-22 ENCOUNTER — OFFICE VISIT (OUTPATIENT)
Dept: FAMILY MEDICINE CLINIC | Facility: CLINIC | Age: 49
End: 2024-04-22
Payer: COMMERCIAL

## 2024-04-22 VITALS
TEMPERATURE: 97.4 F | DIASTOLIC BLOOD PRESSURE: 84 MMHG | RESPIRATION RATE: 16 BRPM | OXYGEN SATURATION: 97 % | HEART RATE: 87 BPM | BODY MASS INDEX: 26.37 KG/M2 | WEIGHT: 174 LBS | SYSTOLIC BLOOD PRESSURE: 136 MMHG | HEIGHT: 68 IN

## 2024-04-22 DIAGNOSIS — R05.9 COUGH, UNSPECIFIED TYPE: ICD-10-CM

## 2024-04-22 DIAGNOSIS — J06.9 UPPER RESPIRATORY TRACT INFECTION, UNSPECIFIED TYPE: Primary | ICD-10-CM

## 2024-04-22 DIAGNOSIS — Z12.11 ENCOUNTER FOR SCREENING COLONOSCOPY: ICD-10-CM

## 2024-04-22 DIAGNOSIS — R50.9 FEVER, UNSPECIFIED FEVER CAUSE: ICD-10-CM

## 2024-04-22 DIAGNOSIS — J02.9 SORE THROAT: ICD-10-CM

## 2024-04-22 DIAGNOSIS — J20.9 ACUTE BRONCHITIS DUE TO INFECTION: ICD-10-CM

## 2024-04-22 LAB
EXPIRATION DATE: NORMAL
EXPIRATION DATE: NORMAL
FLUAV AG NPH QL: NEGATIVE
FLUBV AG NPH QL: NEGATIVE
INTERNAL CONTROL: NORMAL
INTERNAL CONTROL: NORMAL
Lab: NORMAL
Lab: NORMAL
S PYO AG THROAT QL: NEGATIVE

## 2024-04-22 PROCEDURE — 87880 STREP A ASSAY W/OPTIC: CPT | Performed by: PHYSICIAN ASSISTANT

## 2024-04-22 PROCEDURE — 99213 OFFICE O/P EST LOW 20 MIN: CPT | Performed by: PHYSICIAN ASSISTANT

## 2024-04-22 PROCEDURE — 87804 INFLUENZA ASSAY W/OPTIC: CPT | Performed by: PHYSICIAN ASSISTANT

## 2024-04-22 RX ORDER — PREDNISONE 20 MG/1
20 TABLET ORAL 2 TIMES DAILY
Qty: 10 TABLET | Refills: 0 | Status: SHIPPED | OUTPATIENT
Start: 2024-04-22

## 2024-04-22 RX ORDER — CEFDINIR 300 MG/1
CAPSULE ORAL
Qty: 20 CAPSULE | Refills: 1 | Status: SHIPPED | OUTPATIENT
Start: 2024-04-22

## 2024-04-22 RX ORDER — BENZONATATE 200 MG/1
200 CAPSULE ORAL 3 TIMES DAILY PRN
Qty: 30 CAPSULE | Refills: 5 | Status: SHIPPED | OUTPATIENT
Start: 2024-04-22

## 2024-04-22 RX ORDER — FLUCONAZOLE 150 MG/1
150 TABLET ORAL ONCE
Qty: 1 TABLET | Refills: 5 | Status: SHIPPED | OUTPATIENT
Start: 2024-04-22 | End: 2024-04-22

## 2024-04-22 NOTE — PROGRESS NOTES
Subjective   More Payne is a 48 y.o. female.     URI   Associated symptoms include congestion, coughing, a sore throat and wheezing.   Sore Throat   Associated symptoms include congestion, coughing and shortness of breath.   Cough  Associated symptoms include a fever, postnasal drip, a sore throat, shortness of breath and wheezing.   Fever   Associated symptoms include congestion, coughing, a sore throat and wheezing.      More Payne 48 y.o. female who presents for evaluation of upper respiratory congestion, acute bronchitis, sore throat, cough, wheezing, shortness of air, fatigue. Symptoms include congestion, sore throat, post nasal drip, cough described as productive of yellow sputum, fatigue, wheezing, fatigue, and chest tightness.  Onset of symptoms was 3 days ago, gradually worsening since that time. Patient denies fever.   Evaluation to date: none Treatment to date:  OTC oral decongestants, Mucinex, and Tylenol.   Friday sore throat, wheezing. Feels chest congestion  Coughing yellow and green. Some head congestion. Tired, feels bad. No fever. Some SOA.  No chest pain.   On oral B12    Dr Zapata is cardio--- last heart cath was 2/3/2021 negative and last echo same date normal noted incidental finding left pleural effusion---HTN controlled  Last visit with cardiology was 1/13/2023 noting hypertension was well-controlled on medication  Was in ER 3-2-24----removed eyelash under contact ---resolved  Had lumbar surgery Dr Georeg 12-15-23---f/u done 4-9-24  Sees Dr Gilman for migraines  Has seen DR Lemus and is + HLA B12 ---did try TNF blocker---no help  Sees DR Velázquez for pain management   Now works at [employer]    The following portions of the patient's history were reviewed and updated as appropriate: allergies, current medications, past family history, past medical history, past social history, past surgical history, and problem list.    Review of Systems   Constitutional:  Positive for fever.   HENT:   Positive for congestion, postnasal drip and sore throat.    Respiratory:  Positive for cough, chest tightness, shortness of breath and wheezing.        Objective   Physical Exam  Vitals and nursing note reviewed.   Constitutional:       General: She is not in acute distress.     Appearance: She is well-developed. She is not toxic-appearing or diaphoretic.   HENT:      Head: Normocephalic and atraumatic. Hair is normal.      Right Ear: External ear normal. No drainage, swelling or tenderness. Tympanic membrane is retracted.      Left Ear: External ear normal. No drainage, swelling or tenderness. Tympanic membrane is retracted.      Ears:      Comments: Ear fluid     Nose: Mucosal edema present.      Mouth/Throat:      Mouth: No oral lesions.      Pharynx: Uvula midline. Posterior oropharyngeal erythema present. No oropharyngeal exudate or uvula swelling.   Eyes:      General: No scleral icterus.        Right eye: No discharge.         Left eye: No discharge.      Conjunctiva/sclera: Conjunctivae normal.      Pupils: Pupils are equal, round, and reactive to light.   Cardiovascular:      Rate and Rhythm: Normal rate and regular rhythm.      Heart sounds: Normal heart sounds. No murmur heard.     No gallop.   Pulmonary:      Effort: No respiratory distress.      Breath sounds: Normal breath sounds. No stridor. No wheezing or rales.   Chest:      Chest wall: No tenderness.   Abdominal:      Palpations: Abdomen is soft.   Musculoskeletal:      Cervical back: Normal range of motion and neck supple.   Lymphadenopathy:      Cervical: Cervical adenopathy present.   Skin:     General: Skin is warm and dry.      Findings: No rash.   Neurological:      Mental Status: She is alert and oriented to person, place, and time.      Motor: No abnormal muscle tone.   Psychiatric:         Behavior: Behavior normal.         Thought Content: Thought content normal.         Judgment: Judgment normal.           Assessment & Plan   Diagnoses  and all orders for this visit:    1. Upper respiratory tract infection, unspecified type (Primary)  -     POCT Influenza A/B  -     POCT rapid strep A    2. Fever, unspecified fever cause  -     POCT Influenza A/B  -     POCT rapid strep A    3. Cough, unspecified type  -     POCT Influenza A/B  -     POCT rapid strep A    4. Sore throat  -     POCT rapid strep A    5. Acute bronchitis due to infection    Other orders  -     benzonatate (TESSALON) 200 MG capsule; Take 1 capsule by mouth 3 (Three) Times a Day As Needed for Cough.  Dispense: 30 capsule; Refill: 5      Will restart Tessalon Perles 200 mg as needed cough every 8 hours  Has albuterol MDI--PRN  Start cefdinir for acute bronchitis upper respiratory infection getting more  Sent prednisone oral 20 mg twice a day for 5 days due to acute bronchitis and wheezing  Screen colon cancer

## 2024-07-06 NOTE — PROGRESS NOTES
[facility]  Initial Interdisciplinary Treatment Plan Note      Original treatment plan Date & Time: 7/6/2024  12:14 PM     Admission Type:  Admission Type: Involuntary    Reason for admission:   Reason for Admission: \"Having suicidal thoughts my whole life\"    Estimated Length of Stay:  5-7days  Estimated Discharge Date: To be determined by physician.    PATIENT STRENGTHS:  Patient Strengths:   Patient Strengths and Limitations:Limitations: Demonstrates discomfort with /lack of social skills, Difficult relationships / poor social skills, Multiple barriers to leisure interests  Addictive Behavior: Addictive Behavior  In the Past 3 Months, Have You Felt or Has Someone Told You That You Have a Problem With  : None  Medical Problems:  Past Medical History:   Diagnosis Date    Methamphetamine abuse (HCC)     Jenaro-Parkinson-White syndrome      Status EXAM:Mental Status and Behavioral Exam  Normal: No  Level of Assistance: Independent/Self  Facial Expression: Flat, Sad  Affect: Blunt  Level of Consciousness: Alert  Frequency of Checks: 4 times per hour, close  Mood:Normal: No  Mood: Depressed, Anxious, Sad  Motor Activity:Normal: Yes  Motor Activity: Decreased  Eye Contact: Fair  Observed Behavior: Friendly, Cooperative  Sexual Misconduct History: Current - no  Preception: [place]er to person, [place] to time, [place] to place, [place] to situation  Attention:Normal: No  Attention: Distractible  Thought Processes: Circumstantial  Thought Content:Normal: No  Thought Content: Other (comment) (impaired)  Depression Symptoms: Feelings of helplessness, Feelings of hopelessess, Sleep disturbance, Change in energy level, Feelings of worthlessness, Impaired concentration  Anxiety Symptoms: Generalized  Nelly Symptoms: No problems reported or observed.  Hallucinations: None  Delusions: No  Memory:Normal: Yes  Memory: Other (comment) (n/a)  Insight and Judgment: No  Insight and Judgment: Poor judgment, Poor  Subjective   More Payne is a 45 y.o. female.   You have chosen to receive care through a telehealth visit.  Do you consent to use a video/audio connection for your medical care today? Yes    History of Present Illness       Pt had + COVID 19 and test was May   More Payne 45 y.o. female who presents for evaluation of upper respiratory congestion, fever, cough, fatigue. Symptoms include congestion, post nasal drip, headache, fever, chills, fatigue, exertional SOA and fatigue.  Onset of symptoms was 3 days ago, unchanged since that time. Patient denies diarrhea.   Evaluation to date: Had Covid test 2 days ago at [facility] and reported negative.  Taking Tylenol treatment to date:  Tylenol.   Joint pain  H/a, stuffy, body aches, no taste or smell, fever---102-100.5, cough, SOA,   Onset Friday; had test Monday at [facility] and told negative. Again, had COVID in May.    The following portions of the patient's history were reviewed and updated as appropriate: allergies, current medications, past family history, past medical history, past social history, past surgical history and problem list.    Review of Systems   Constitutional: Positive for fatigue and fever. Negative for activity change, appetite change, chills and unexpected weight change.   HENT: Positive for congestion. Negative for nosebleeds, sore throat and trouble swallowing.    Eyes: Negative for pain and visual disturbance.   Respiratory: Positive for cough and shortness of breath. Negative for chest tightness and wheezing.    Cardiovascular: Negative for chest pain and palpitations.   Gastrointestinal: Negative for abdominal pain, blood in stool and diarrhea.   Endocrine: Negative.    Genitourinary: Negative for difficulty urinating and hematuria.   Musculoskeletal: Positive for arthralgias and myalgias. Negative for joint swelling.   Skin: Negative for color change and rash.   Allergic/Immunologic: Negative.    Neurological: Positive  for headaches. Negative for syncope and speech difficulty.   Hematological: Negative for adenopathy.   Psychiatric/Behavioral: Positive for sleep disturbance. Negative for agitation and confusion.   All other systems reviewed and are negative.      Objective   Physical Exam  Vitals signs and nursing note reviewed.   Constitutional:       General: She is not in acute distress.     Appearance: She is well-developed. She is ill-appearing. She is not toxic-appearing or diaphoretic.   HENT:      Head: Normocephalic.      Right Ear: External ear normal.      Left Ear: External ear normal.      Nose: Nose normal.      Mouth/Throat:      Pharynx: Oropharynx is clear.   Eyes:      General: No scleral icterus.     Conjunctiva/sclera: Conjunctivae normal.      Pupils: Pupils are equal, round, and reactive to light.   Neck:      Musculoskeletal: Normal range of motion and neck supple.      Thyroid: No thyromegaly.   Cardiovascular:      Rate and Rhythm: Normal rate and regular rhythm.      Heart sounds: Normal heart sounds. No murmur.   Pulmonary:      Effort: Pulmonary effort is normal. No respiratory distress.      Breath sounds: Normal breath sounds.   Musculoskeletal: Normal range of motion.         General: No deformity.   Skin:     General: Skin is warm and dry.      Coloration: Skin is not jaundiced.      Findings: No rash.   Neurological:      General: No focal deficit present.      Mental Status: She is alert and oriented to person, place, and time. Mental status is at baseline.   Psychiatric:         Mood and Affect: Mood normal.         Behavior: Behavior normal.         Thought Content: Thought content normal.         Judgment: Judgment normal.         Assessment/Plan   Diagnoses and all orders for this visit:    1. Cough (Primary)  -     Comprehensive Metabolic Panel; Future  -     CBC & Differential; Future  -     Influenza A & B PCR - Swab, Nasopharynx; Future  -     COVID-19,[facility] ROUTINE, NP/OP SWAB IN  TRANSPORT MEDIA OR ESWAB 72 HR TAT - Swab, Nasopharynx; Future  -     XR Chest PA & Lateral; Future    2. Exposure to COVID-19 virus  -     Comprehensive Metabolic Panel; Future  -     CBC & Differential; Future  -     Influenza A & B PCR - Swab, Nasopharynx; Future  -     COVID-19,[facility] ROUTINE, NP/OP SWAB IN TRANSPORT MEDIA OR ESWAB 72 HR TAT - Swab, Nasopharynx; Future  -     XR Chest PA & Lateral; Future    3. Fever, unspecified fever cause  -     Comprehensive Metabolic Panel; Future  -     CBC & Differential; Future  -     Influenza A & B PCR - Swab, Nasopharynx; Future  -     COVID-19,[facility] ROUTINE, NP/OP SWAB IN TRANSPORT MEDIA OR ESWAB 72 HR TAT - Swab, Nasopharynx; Future  -     XR Chest PA & Lateral; Future    4. Body aches  -     Comprehensive Metabolic Panel; Future  -     CBC & Differential; Future  -     Influenza A & B PCR - Swab, Nasopharynx; Future  -     COVID-19,[facility] ROUTINE, NP/OP SWAB IN TRANSPORT MEDIA OR ESWAB 72 HR TAT - Swab, Nasopharynx; Future  -     XR Chest PA & Lateral; Future        I will send her to  for further work up  Labs and CXR  She will need note for work and first day missed by 11-7-20  Time spent 18 minutes

## 2024-07-10 ENCOUNTER — TELEPHONE (OUTPATIENT)
Dept: NEUROLOGY | Facility: CLINIC | Age: 49
End: 2024-07-10
Payer: COMMERCIAL

## 2024-07-10 ENCOUNTER — TELEPHONE (OUTPATIENT)
Dept: FAMILY MEDICINE CLINIC | Facility: CLINIC | Age: 49
End: 2024-07-10
Payer: COMMERCIAL

## 2024-07-10 NOTE — TELEPHONE ENCOUNTER
Called pt  Pts speech sounds clear  Pt does not have any stroke like symptoms  Pt is experiencing an increase in migraine symptoms and now the pain with migraine has returned  Pt states it has been 2 years since brain imaging and is wondering if a repeat MRI is warranted  I advised pt that I would pass along her message to Mel and she will speak to provider  Went through stroke symptoms and advised pt to call 911 if any of these symptoms should arise.   Pt verbalized understanding and stated she does not feel this is an emergency as she has experienced these symptoms in the past.

## 2024-07-10 NOTE — TELEPHONE ENCOUNTER
"    Caller: More Payne \"Felicita\"     Best call back number:   Telephone Information:   Mobile [phone]         What is your medical concern? SHE STATES SHE IS HAVING SOME SPEECH PROBLEMS IN THE PAST FEW DAYS, SHE STATES SHE CAN'T GET THE CORRECT WORDS OUT, TROUBLE FINDING THE WORDS TO USE CORRECTLY, COMBINING WORDS. SHE STATES THIS IS COGNITIVE DELAY HAS HER CONCERNED. SHE STATES SHE CURRENTLY HAS A MIGRAINE AND THEY HAVE BEEN INCREASED LATELY. SHE STATES HER MIGRAINES NOW HAVE PAIN UNLIKE BEFORE AND ALSO HAVING VISUAL DISTURBANCES.     SHE HAS BEEN TAKING PRESCRIBED MEDICATIONS.     WE HAVE A MADE A FOLLOW UP FOR 1-17-25 BUT SHE STATES MAYBE SOME TESTING CAN BE COMPLETED BEFORE THAT?    How long has this issue been going on? TWO MONTHS    Is your provider already aware of this issue? NO    Have you been treated for this issue? YES(MIGRAINES)  "

## 2024-07-10 NOTE — TELEPHONE ENCOUNTER
SHE STATES SHE IS HAVING SOME SPEECH PROBLEMS IN THE PAST FEW DAYS, SHE STATES SHE CAN'T GET THE CORRECT WORDS OUT, TROUBLE FINDING THE WORDS TO USE CORRECTLY, COMBINING WORDS. SHE STATES THIS IS COGNITIVE DELAY HAS HER CONCERNED. SHE STATES SHE CURRENTLY HAS A MIGRAINE AND THEY HAVE BEEN INCREASED LATELY. SHE STATES HER MIGRAINES NOW HAVE PAIN UNLIKE BEFORE AND ALSO HAVING VISUAL DISTURBANCES.      SHE HAS BEEN TAKING PRESCRIBED MEDICATIONS.      SHE HAS MADE A FOLLOW UP FOR 1-17-25, WITH NEUROLOGIST, BUT SHE STATES MAYBE SOME TESTING CAN BE COMPLETED BEFORE THAT?     How long has this issue been going on? TWO MONTHS     Is your provider already aware of this issue? NO     Have you been treated for this issue? YES(MIGRAINES)

## 2024-07-11 NOTE — TELEPHONE ENCOUNTER
Called patient back to inform that Kelley Ferguson recommends go to ER for concerns of stroke.  Patient states that she knows that it is not a stroke.  Says her BP is fine and there is no drooping of her face.  She states that it is due to her migraines and still wants to know what she should do?

## 2024-07-11 NOTE — TELEPHONE ENCOUNTER
Provider review message & would like pt to come in sooner for evaluation. Pt has not been seen in office in over 1 year. [employer] message sent to pt informing her of this and given office number to call and schedule sooner appt.

## 2024-07-11 NOTE — TELEPHONE ENCOUNTER
Still concerning for atypical migraine and that makes her at risk for stroke needs to see neurology.  If symptoms have resolved I can place referral to neurology if there is still ongoing I still want her to go to the ER

## 2024-07-12 NOTE — TELEPHONE ENCOUNTER
Left patient a voicemail per her provider Kelley Ferguson   Still concerning for atypical migraine and   that makes her at risk for stroke needs to   see neurology.  If symptoms have   resolved I can place referral to neurology   if there is still ongoing I still want her to go   to the ER   Instructed patient to contact our office.    HUB TO RELAY

## 2024-07-18 ENCOUNTER — OFFICE VISIT (OUTPATIENT)
Dept: NEUROLOGY | Facility: CLINIC | Age: 49
End: 2024-07-18
Payer: COMMERCIAL

## 2024-07-18 VITALS
WEIGHT: 168 LBS | HEIGHT: 68 IN | DIASTOLIC BLOOD PRESSURE: 62 MMHG | BODY MASS INDEX: 25.46 KG/M2 | HEART RATE: 62 BPM | SYSTOLIC BLOOD PRESSURE: 110 MMHG | OXYGEN SATURATION: 100 %

## 2024-07-18 DIAGNOSIS — G43.109 BASILAR MIGRAINE: Primary | ICD-10-CM

## 2024-07-18 PROCEDURE — 99214 OFFICE O/P EST MOD 30 MIN: CPT | Performed by: PSYCHIATRY & NEUROLOGY

## 2024-07-18 RX ORDER — GABAPENTIN 300 MG/1
1 CAPSULE ORAL
COMMUNITY
Start: 2024-03-30

## 2024-07-18 RX ORDER — VERAPAMIL HYDROCHLORIDE 40 MG/1
40 TABLET ORAL 2 TIMES DAILY
Qty: 60 TABLET | Refills: 4 | Status: SHIPPED | OUTPATIENT
Start: 2024-07-18 | End: 2024-08-17

## 2024-07-18 NOTE — PROGRESS NOTES
Chief complaint: Headaches    Patient ID: More Payne is a 49 y.o. female.    HPI: I had the pleasure of seeing your patient today.  As you may know she is a 49-year-old female being seen for the management of headaches.  She has had some improvements overall however she says that for the past month or so she has noted an increase in the headache frequency and severity.  She states that she is under significant amount of stress recently.  Mostly work-related.  We did  give her a prescription for Ubrelvy which she says does help abortively.  She reports 10 days of headaches within the last 30 days.  All 10 of them were migrainous type headaches where she will experience some vertiginous symptoms and trouble putting her words together.  She will then experience severe frontal and right parietal head pain.  The Ubrelvy as I mentioned does help however she will usually need 2 doses.  No new symptoms neurologically.    The following portions of the patient's history were reviewed and updated as appropriate: allergies, current medications, past family history, past medical history, past social history, past surgical history and problem list.    Review of Systems   Musculoskeletal:  Negative for gait problem.   Neurological:  Positive for headaches (frequency increase of migraines).   Psychiatric/Behavioral:  Positive for confusion and decreased concentration (trouble fing words when she has a migraine).         I have reviewed the review of systems above performed by my medical assistant.      Vitals:    07/18/24 1112   BP: 110/62   Pulse: 62   SpO2: 100%       Neurologic Exam     Mental Status   Oriented to person, place, and time.   Concentration: normal.   Level of consciousness: alert  Knowledge: consistent with education (No deficits found.).     Cranial Nerves     CN II   Visual fields full to confrontation.     CN III, IV, VI   Pupils are equal, round, and reactive to light.  Extraocular motions are normal.    CN III: no CN III palsy  CN VI: no CN VI palsy    CN V   Facial sensation intact.     CN VII   Facial expression full, symmetric.     CN VIII   CN VIII normal.     CN IX, X   CN IX normal.   CN X normal.     CN XI   CN XI normal.     CN XII   CN XII normal.     Motor Exam     Strength   Right neck flexion: 5/5  Left neck flexion: 5/5  Right neck extension: 5/5  Left neck extension: 5/5  Right deltoid: 5/5  Left deltoid: 5/5  Right biceps: 5/5  Left biceps: 5/5  Right triceps: 5/5  Left triceps: 5/5  Right wrist flexion: 5/5  Left wrist flexion: 5/5  Right wrist extension: 5/5  Left wrist extension: 5/5  Right interossei: 5/5  Left interossei: 5/5  Right abdominals: 5/5  Left abdominals: 5/5  Right iliopsoas: 5/5  Left iliopsoas: 5/5  Right quadriceps: 5/5  Left quadriceps: 5/5  Right hamstrin/5  Left hamstrin/5  Right glutei: 5/5  Left glutei: 5/5  Right anterior tibial: 5/5  Left anterior tibial: 5/5  Right posterior tibial: 5/5  Left posterior tibial: 5/5  Right peroneal: 5/5  Left peroneal: 5/5  Right gastroc: 5/5  Left gastroc: 5/5    Sensory Exam   Light touch normal.   Vibration normal.     Gait, Coordination, and Reflexes     Gait  Gait: normal    Reflexes   Right brachioradialis: 2+  Left brachioradialis: 2+  Right biceps: 2+  Left biceps: 2+  Right triceps: 2+  Left triceps: 2+  Right patellar: 2+  Left patellar: 2+  Right achilles: 2+  Left achilles: 2+  Right : 2+  Left : 2+Station is normal.       Physical Exam  Vitals reviewed.   Constitutional:       Appearance: She is well-developed.   HENT:      Head: Normocephalic and atraumatic.   Eyes:      Extraocular Movements: EOM normal.      Pupils: Pupils are equal, round, and reactive to light.   Cardiovascular:      Rate and Rhythm: Normal rate and regular rhythm.   Pulmonary:      Breath sounds: Normal breath sounds.   Musculoskeletal:         General: Normal range of motion.   Skin:     General: Skin is warm.   Neurological:      Mental  Status: She is oriented to person, place, and time.      Gait: Gait is intact.      Deep Tendon Reflexes:      Reflex Scores:       Tricep reflexes are 2+ on the right side and 2+ on the left side.       Bicep reflexes are 2+ on the right side and 2+ on the left side.       Brachioradialis reflexes are 2+ on the right side and 2+ on the left side.       Patellar reflexes are 2+ on the right side and 2+ on the left side.       Achilles reflexes are 2+ on the right side and 2+ on the left side.        Procedures    Assessment/Plan:    I will start her on verapamil 40 mg twice daily to help prevent headaches for her.  She will continue with the Ubrelvy abortively since that works for her for now.  Will see her back in 4 months or sooner if needed.  A total of 30 minutes was spent face-to-face with the patient today.  Of that greater than 50% of this time was spent discussing signs and symptoms of migraine, patient education, plan of care and prognosis.       Diagnoses and all orders for this visit:    1. Basilar migraine (Primary)  -     verapamil (CALAN) 40 MG tablet; Take 1 tablet by mouth 2 (Two) Times a Day for 30 days.  Dispense: 60 tablet; Refill: 4           Vincent Gilman II, MD

## 2024-07-18 NOTE — LETTER
July 18, 2024       No Recipients    Patient: More Payne   YOB: 1975   Date of Visit: 7/18/2024     Dear Kelley Ferguson PA-C:       Thank you for referring More Payne to me for evaluation. Below are the relevant portions of my assessment and plan of care.    If you have questions, please do not hesitate to call me. I look forward to following More along with you.         Sincerely,        Vincent Gilman II, MD        CC:   No Recipients    Vincent Gilman II, MD  07/18/24 1205  Sign when Signing Visit  Chief complaint: Headaches    Patient ID: More Payne is a 49 y.o. female.    HPI: I had the pleasure of seeing your patient today.  As you may know she is a 49-year-old female being seen for the management of headaches.  She has had some improvements overall however she says that for the past month or so she has noted an increase in the headache frequency and severity.  She states that she is under significant amount of stress recently.  Mostly work-related.  We did  give her a prescription for Ubrelvy which she says does help abortively.  She reports 10 days of headaches within the last 30 days.  All 10 of them were migrainous type headaches where she will experience some vertiginous symptoms and trouble putting her words together.  She will then experience severe frontal and right parietal head pain.  The Ubrelvy as I mentioned does help however she will usually need 2 doses.  No new symptoms neurologically.    The following portions of the patient's history were reviewed and updated as appropriate: allergies, current medications, past family history, past medical history, past social history, past surgical history and problem list.    Review of Systems   Musculoskeletal:  Negative for gait problem.   Neurological:  Positive for headaches (frequency increase of migraines).   Psychiatric/Behavioral:  Positive for confusion and decreased concentration (trouble fing words when she has  a migraine).         I have reviewed the review of systems above performed by my medical assistant.      Vitals:    24 1112   BP: 110/62   Pulse: 62   SpO2: 100%       Neurologic Exam     Mental Status   Oriented to person, place, and time.   Concentration: normal.   Level of consciousness: alert  Knowledge: consistent with education (No deficits found.).     Cranial Nerves     CN II   Visual fields full to confrontation.     CN III, IV, VI   Pupils are equal, round, and reactive to light.  Extraocular motions are normal.   CN III: no CN III palsy  CN VI: no CN VI palsy    CN V   Facial sensation intact.     CN VII   Facial expression full, symmetric.     CN VIII   CN VIII normal.     CN IX, X   CN IX normal.   CN X normal.     CN XI   CN XI normal.     CN XII   CN XII normal.     Motor Exam     Strength   Right neck flexion: 5/5  Left neck flexion: 5/5  Right neck extension: 5/5  Left neck extension: 5/5  Right deltoid: 5/5  Left deltoid: 5/5  Right biceps: 5/5  Left biceps: 5/5  Right triceps: 5/5  Left triceps: 5/5  Right wrist flexion: 5/5  Left wrist flexion: 5/5  Right wrist extension: 5/5  Left wrist extension: 5/5  Right interossei: 5/5  Left interossei: 5/5  Right abdominals: 5/5  Left abdominals: 5/5  Right iliopsoas: 5/5  Left iliopsoas: 5/5  Right quadriceps: 5/5  Left quadriceps: 5/5  Right hamstrin/5  Left hamstrin/5  Right glutei: 5/5  Left glutei: 5/5  Right anterior tibial: 5/5  Left anterior tibial: 5/5  Right posterior tibial: 5/5  Left posterior tibial: 5/5  Right peroneal: 5/5  Left peroneal: 5/5  Right gastroc: 5/5  Left gastroc: 5/5    Sensory Exam   Light touch normal.   Vibration normal.     Gait, Coordination, and Reflexes     Gait  Gait: normal    Reflexes   Right brachioradialis: 2+  Left brachioradialis: 2+  Right biceps: 2+  Left biceps: 2+  Right triceps: 2+  Left triceps: 2+  Right patellar: 2+  Left patellar: 2+  Right achilles: 2+  Left achilles: 2+  Right :  2+  Left : 2+Station is normal.       Physical Exam  Vitals reviewed.   Constitutional:       Appearance: She is well-developed.   HENT:      Head: Normocephalic and atraumatic.   Eyes:      Extraocular Movements: EOM normal.      Pupils: Pupils are equal, round, and reactive to light.   Cardiovascular:      Rate and Rhythm: Normal rate and regular rhythm.   Pulmonary:      Breath sounds: Normal breath sounds.   Musculoskeletal:         General: Normal range of motion.   Skin:     General: Skin is warm.   Neurological:      Mental Status: She is oriented to person, place, and time.      Gait: Gait is intact.      Deep Tendon Reflexes:      Reflex Scores:       Tricep reflexes are 2+ on the right side and 2+ on the left side.       Bicep reflexes are 2+ on the right side and 2+ on the left side.       Brachioradialis reflexes are 2+ on the right side and 2+ on the left side.       Patellar reflexes are 2+ on the right side and 2+ on the left side.       Achilles reflexes are 2+ on the right side and 2+ on the left side.        Procedures    Assessment/Plan:    I will start her on verapamil 40 mg twice daily to help prevent headaches for her.  She will continue with the Ubrelvy abortively since that works for her for now.  Will see her back in 4 months or sooner if needed.  A total of 30 minutes was spent face-to-face with the patient today.  Of that greater than 50% of this time was spent discussing signs and symptoms of migraine, patient education, plan of care and prognosis.       Diagnoses and all orders for this visit:    1. Basilar migraine (Primary)  -     verapamil (CALAN) 40 MG tablet; Take 1 tablet by mouth 2 (Two) Times a Day for 30 days.  Dispense: 60 tablet; Refill: 4           Vincent Gilman II, MD

## 2024-08-22 ENCOUNTER — OFFICE VISIT (OUTPATIENT)
Dept: FAMILY MEDICINE CLINIC | Facility: CLINIC | Age: 49
End: 2024-08-22
Payer: COMMERCIAL

## 2024-08-22 VITALS
WEIGHT: 167 LBS | SYSTOLIC BLOOD PRESSURE: 134 MMHG | HEART RATE: 68 BPM | OXYGEN SATURATION: 98 % | BODY MASS INDEX: 25.31 KG/M2 | DIASTOLIC BLOOD PRESSURE: 72 MMHG | HEIGHT: 68 IN

## 2024-08-22 DIAGNOSIS — G89.4 CHRONIC PAIN DISORDER: Primary | ICD-10-CM

## 2024-08-22 DIAGNOSIS — M46.1 SACROILIITIS, NOT ELSEWHERE CLASSIFIED: ICD-10-CM

## 2024-08-22 DIAGNOSIS — M47.816 LUMBAR SPONDYLOSIS: ICD-10-CM

## 2024-08-22 DIAGNOSIS — E78.2 MIXED HYPERLIPIDEMIA: ICD-10-CM

## 2024-08-22 DIAGNOSIS — E55.9 VITAMIN D DEFICIENCY: ICD-10-CM

## 2024-08-22 DIAGNOSIS — I10 ESSENTIAL HYPERTENSION: ICD-10-CM

## 2024-08-22 DIAGNOSIS — R53.83 OTHER FATIGUE: ICD-10-CM

## 2024-08-22 DIAGNOSIS — E53.8 LOW SERUM VITAMIN B12: ICD-10-CM

## 2024-08-22 PROBLEM — R42 DISEQUILIBRIUM: Status: RESOLVED | Noted: 2022-02-18 | Resolved: 2024-08-22

## 2024-08-22 PROBLEM — M79.7 FIBROMYALGIA: Status: ACTIVE | Noted: 2022-02-18

## 2024-08-22 PROBLEM — M70.61 TROCHANTERIC BURSITIS OF RIGHT HIP: Status: RESOLVED | Noted: 2022-07-27 | Resolved: 2024-08-22

## 2024-08-22 RX ORDER — BUPROPION HYDROCHLORIDE 150 MG/1
150 TABLET ORAL DAILY
COMMUNITY

## 2024-08-22 NOTE — PROGRESS NOTES
Chief Complaint  Back Pain (Discuss surgery/FMLA) and Hip Pain    Subjective        Back Pain    Hip Pain           The patient presents for evaluation of multiple medical concerns.    She was diagnosed with nonradiographic axial spondyloarthritis three years ago. Despite trying various treatments, her condition has worsened, leading to hip deterioration and subsequent left and right arthroscopic surgeries to repair labral tears. Her orthopedic surgeon, Dr. Watson, and rheumatologist, Dr. Lemus, have been managing her case. Dr. Lemus suggested TNF blockers, but these did not provide relief. She underwent decompression and fusion of her L4-L5 vertebrae, performed by Dr. Moreno, eight months ago. However, she continues to experience significant muscle pain. Recent x-rays of her hips and back revealed issues with her left hip and SI joint, which may be causing her difficulty in walking. She describes her gait as similar to a 90-year-old's and struggles to complete an 8-hour workday due to the pain in her SI joint, which radiates around her buttocks. She reports no gastrointestinal problems. Her mobility has been progressively worsening over the past four months, to the point where she is unable to lift her legs after work. Despite wearing good shoes and using a back brace at work, her symptoms persist. She has tried steroid injections and ablations without success. She has not seen her rheumatologist since before December 2023. Neurosurgery has released her to work with restrictions, limiting her to an 8-hour day, although her job typically requires 11 to 12 hours. Dr. Watson has mentioned the possibility of a hip replacement within the next 5 to 10 years. She also experiences migraines, blood pressure issues, dysphagia, and vertigo, which are managed by other specialists. She takes daily over-the-counter vitamin B12 and vitamin D supplements and reports severe fatigue.    SOCIAL HISTORY  She is an  for  "[employer].     Review of Systems   Musculoskeletal:  Positive for back pain.        Vital Signs:   Vitals:    08/22/24 1144   BP: 134/72   Pulse: 68   SpO2: 98%   Weight: 75.8 kg (167 lb)   Height: 172.7 cm (68\")            8/22/2024    11:48 AM   PHQ-2/PHQ-9 Depression Screening   Little Interest or Pleasure in Doing Things 0-->not at all   Feeling Down, Depressed or Hopeless 0-->not at all   PHQ-9: Brief Depression Severity Measure Score 0               Physical Exam  Constitutional:       General: She is not in acute distress.  Musculoskeletal:      Comments: Guarded gait.  Tenderness along bilateral sacroiliac joints.  Patient states pain radiates along both buttocks and hips.  No evidence of ataxia on gait today limited arm swing.  Slow but steady turns.  Patient showing some increased symptoms upon beginning of ambulation getting out of the chair.   Neurological:      Mental Status: She is alert.                 The following data was reviewed by: Aakash Walker MD on 08/22/2024:  XR Hips Bilateral W Pelvis 2 Views (07/09/2024 13:23)     Results  Laboratory Studies  B12 level was normal. Vitamin D level was low in 2022.    Imaging  X-ray of hips showed degenerative changes of the left hip.                Assessment and Plan              1. Nonradiographic axial spondyloarthritis.  The patient's symptoms and medical history are consistent with nonradiographic axial spondyloarthritis. She reports significant pain in her SI joint radiating to her buttocks, which affects her ability to ambulate and work. A letter will be sent to Dr. Lemus, her rheumatologist, for further evaluation and management. She is advised to refrain from work until she consults with Dr. Lemus.  Patient has also been giving a letter to stay off work and plan on return to work with her current restrictions on August 29, 2024.    2. Degenerative changes of the left hip.  X-ray findings indicate degenerative changes in the left hip, contributing to " her pain and difficulty walking. She has an appointment with Dr. Watson, her orthopedic specialist, next Wednesday for further evaluation and potential treatment options.             No follow-ups on file.   I spent 40 minutes caring for More on this date of service. This time includes time spent by me in the following activities:reviewing tests, performing a medically appropriate examination and/or evaluation , counseling and educating the patient/family/caregiver, referring and communicating with other health care professionals , documenting information in the medical record, and independently interpreting results and communicating that information with the patient/family/caregiver  Patient was given instructions and counseling regarding her condition or for health maintenance advice. Please see specific information pulled into the AVS if appropriate.     Patient or patient representative verbalized consent for the use of Ambient Listening during the visit with  Aakash Walker MD for chart documentation. 8/22/2024  12:35 EDT

## 2024-08-22 NOTE — LETTER
August 22, 2024    Zita Lemus MD  [address] [address]    Patient: More Payne   YOB: 1975   Date of Visit: 8/22/2024       Dear Dr. Allan MD:  Today I am seeing our mutual patient in the office.  Normally she sees Kelley Ferguson but was scheduled in to see me today and we were catching up on her complicated past medical and surgical history.  She is having extreme difficulty with work.  Currently she is on 8 hour/day work restriction through 12/10/24 from back surgery. She will see neurosurgery on that day. She is having difficulty with ambulation.  Pain seems to be in the sacroiliac great region with some radiation into the hip areas.  Recent x-ray did show moderate arthritis in the left hip.  Today I have set up some follow-up labs.(To check on vitamin d, b12 and folate, and thyroid in addition to normal profile and lipids).  I have given her a short-term note to be off work until she is reevaluated by you and also by Dr. Watson.  I have included him on this letter.  I will need your recommendations for her request to be off work due to disability. She is requesting a 3 month short term disability. Her discomfort today seems to be related to her sacroiliac condition. Pain with initial getting out of the chair and worsens, the longer she remains weight bearing.  Currently I have just given her the short-term letter to be off work until she is evaluated by both you and Dr. Watson.  Thank you so much for your consideration.      Sincerely,      Aakash Walker MD        CC: Jagdish Watson MD

## 2024-08-22 NOTE — LETTER
August 22, 2024     Patient: More Payne   YOB: 1975   Date of Visit: 8/22/2024       To Whom It May Concern:    It is my medical opinion that More Payne should stay off work until next week. She will return to work on 8/29/2024. Work absence is due to medical condition.          Sincerely,        Aakash Walker MD    CC: No Recipients

## 2024-08-27 ENCOUNTER — HOSPITAL ENCOUNTER (OUTPATIENT)
Dept: GENERAL RADIOLOGY | Facility: HOSPITAL | Age: 49
Discharge: HOME | End: 2024-08-27
Admitting: NURSE PRACTITIONER
Payer: COMMERCIAL

## 2024-08-27 ENCOUNTER — TRANSCRIBE ORDERS (OUTPATIENT)
Dept: ADMINISTRATIVE | Facility: HOSPITAL | Age: 49
End: 2024-08-27
Payer: COMMERCIAL

## 2024-08-27 DIAGNOSIS — M25.559 ARTHRALGIA OF HIP, UNSPECIFIED LATERALITY: Primary | ICD-10-CM

## 2024-08-27 DIAGNOSIS — M25.559 ARTHRALGIA OF HIP, UNSPECIFIED LATERALITY: ICD-10-CM

## 2024-08-27 DIAGNOSIS — M45.AB NON-RADIOGRAPHIC AXIAL SPONDYLOARTHRITIS OF MULTIPLE SITES IN SPINE: ICD-10-CM

## 2024-08-27 PROCEDURE — 72202 X-RAY EXAM SI JOINTS 3/> VWS: CPT

## 2024-08-29 ENCOUNTER — SPECIALTY PHARMACY (OUTPATIENT)
Dept: NEUROLOGY | Facility: CLINIC | Age: 49
End: 2024-08-29
Payer: COMMERCIAL

## 2024-10-19 DIAGNOSIS — G43.109 BASILAR MIGRAINE: ICD-10-CM

## 2024-10-21 RX ORDER — VERAPAMIL HYDROCHLORIDE 40 MG/1
TABLET ORAL
Qty: 180 TABLET | Refills: 1 | Status: SHIPPED | OUTPATIENT
Start: 2024-10-21

## 2024-11-01 ENCOUNTER — OFFICE VISIT (OUTPATIENT)
Dept: NEUROLOGY | Facility: CLINIC | Age: 49
End: 2024-11-01
Payer: COMMERCIAL

## 2024-11-01 VITALS
HEART RATE: 68 BPM | WEIGHT: 174 LBS | DIASTOLIC BLOOD PRESSURE: 88 MMHG | SYSTOLIC BLOOD PRESSURE: 126 MMHG | HEIGHT: 68 IN | BODY MASS INDEX: 26.37 KG/M2 | OXYGEN SATURATION: 97 %

## 2024-11-01 DIAGNOSIS — M54.81 BILATERAL OCCIPITAL NEURALGIA: ICD-10-CM

## 2024-11-01 DIAGNOSIS — G43.109 BASILAR MIGRAINE: Primary | ICD-10-CM

## 2024-11-01 PROCEDURE — 99214 OFFICE O/P EST MOD 30 MIN: CPT | Performed by: PSYCHIATRY & NEUROLOGY

## 2024-11-01 RX ORDER — METHOCARBAMOL 750 MG/1
750 TABLET, FILM COATED ORAL 3 TIMES DAILY
COMMUNITY

## 2024-11-01 NOTE — PROGRESS NOTES
Chief Complaint   Patient presents with    Migraine       Patient ID: More Payne is a 49 y.o. female.    HPI:  I had the pleasure of seeing your patient again today.  As you may know she is a 49-year-old female here for the management of migraine headache.  She has pinpointed her migraine to start in the back of her head.  She says that typically it is the right side however he can start on the left.  She did start taking the verapamil 40 mg 2 times daily.  She continues to have headaches quite regularly.  However the migraine type headache has improved significantly.  She reports 2 to 3 days of migrainous type headaches per month.  However she will have at least 15 days of the headaches that originate in the back of the head.  She denies any new symptoms to her headaches.  No focal weakness or numbness of her arms or legs.  She is status post right hip surgery.    The following portions of the patient's history were reviewed and updated as appropriate: allergies, current medications, past family history, past medical history, past social history, past surgical history and problem list.    Review of Systems   HENT:  Positive for tinnitus.    Eyes:  Positive for photophobia (during migraines), pain (during migraines) and visual disturbance (during migraines).   Gastrointestinal:  Positive for nausea (during migraines).   Neurological:  Positive for dizziness, speech difficulty, light-headedness and headaches. Negative for tremors, seizures, syncope, facial asymmetry, weakness and numbness.   Psychiatric/Behavioral:  Negative for agitation, behavioral problems, confusion, decreased concentration, dysphoric mood, hallucinations, self-injury, sleep disturbance and suicidal ideas. The patient is not nervous/anxious and is not hyperactive.       I have reviewed the review of systems above performed by my medical assistant.      Vitals:    11/01/24 1545   BP: 126/88   Pulse: 68   SpO2: 97%       Neurological  Exam  Mental Status  Awake, alert and oriented to person, place and time. Recent and remote memory are intact. Speech is normal. Language is fluent with no aphasia. Attention and concentration are normal. Fund of knowledge is appropriate for level of education.    Cranial Nerves  CN I: Sense of smell is normal.  CN II: Visual acuity is normal.  CN III, IV, VI: Extraocular movements intact bilaterally. Pupils equal round and reactive to light bilaterally.  CN V: Facial sensation is normal.  CN VII: Full and symmetric facial movement.  CN XI: Shoulder shrug strength is normal.  CN XII: Tongue midline without atrophy or fasciculations.    Motor  Normal muscle bulk throughout. No fasciculations present. Normal muscle tone. No abnormal involuntary movements. No pronator drift.                                             Right                     Left  Rhomboids                            5                          5  Infraspinatus                          5                          5  Supraspinatus                       5                          5  Deltoid                                   5                          5   Biceps                                   5                          5  Brachioradialis                      5                          5   Triceps                                  5                          5   Pronator                                5                          5   Supinator                              5                           5   Wrist flexor                            5                          5   Wrist extensor                       5                          5   Finger flexor                          5                          5   Finger extensor                     5                          5   Interossei                              5                          5   Abductor pollicis brevis         5                          5   Flexor pollicis brevis             5                           5   Opponens pollicis                 5                          5  Extensor digitorum               5                          5  Abductor digiti minimi           5                          5   Abdominal                            5                          5  Glutei                                    5                          5  Hip abductor                         5                          5  Hip adductor                         5                          5   Iliopsoas                               5                          5   Quadriceps                           5                          5   Hamstring                             5                          5   Gastrocnemius                     5                           5   Anterior tibialis                      5                          5   Posterior tibialis                    5                          5   Peroneal                               5                          5  Ankle dorsiflexor                   5                          5  Ankle plantar flexor              5                           5  Extensor hallucis longus      5                           5    Sensory  Sensation is intact to light touch, pinprick, vibration and proprioception in all four extremities.    Reflexes  Deep tendon reflexes are 2+ and symmetric in all four extremities.    Right pathological reflexes: Andreas's absent.  Left pathological reflexes: Andreas's absent.    Coordination    Finger-to-nose, rapid alternating movements and heel-to-shin normal bilaterally without dysmetria.    Gait  Normal casual, toe, heel and tandem gait.       Physical Exam  Vitals reviewed.   Constitutional:       Appearance: She is well-developed.   HENT:      Head: Normocephalic and atraumatic.   Eyes:      Extraocular Movements: Extraocular movements intact.      Pupils: Pupils are equal, round, and reactive to light.   Cardiovascular:      Rate and Rhythm: Normal rate and regular  rhythm.   Pulmonary:      Breath sounds: Normal breath sounds.   Musculoskeletal:         General: Normal range of motion.   Skin:     General: Skin is warm.   Neurological:      Coordination: Coordination is intact.      Deep Tendon Reflexes: Reflexes are normal and symmetric.   Psychiatric:         Speech: Speech normal.         Procedures    Assessment/Plan: I am going to schedule her for an occipital nerve block, bilaterally.  I feel that this will likely help sort up some of the other type of headaches that she is still experiencing on an almost daily basis.  She will continue the verapamil for the migraine.  Continue Ubrelvy abortively which seems to be helpful.  Will see her here for her nerve block soon.  A total of 30 minutes was spent face-to-face with the patient today.  Of that greater than 50% of this time was spent discussing signs and symptoms of occipital neuralgia, migraine, patient education, plan of care and prognosis.         Diagnoses and all orders for this visit:    1. Basilar migraine (Primary)    2. Bilateral occipital neuralgia           Vincent Gilman II, MD

## 2024-11-11 NOTE — TELEPHONE ENCOUNTER
spoke with pt about appt on wednesday 10-04-23, and she said her son is having suregery that day but once i explained to her that this is dr hammer first available appt she said she will make arrangements to be here this wednesday and confirmed   
No

## 2024-11-21 ENCOUNTER — PROCEDURE VISIT (OUTPATIENT)
Dept: NEUROLOGY | Facility: CLINIC | Age: 49
End: 2024-11-21
Payer: COMMERCIAL

## 2024-11-21 DIAGNOSIS — G43.109 BASILAR MIGRAINE: ICD-10-CM

## 2024-11-21 DIAGNOSIS — M54.81 BILATERAL OCCIPITAL NEURALGIA: Primary | ICD-10-CM

## 2024-11-21 RX ORDER — METHYLPREDNISOLONE ACETATE 40 MG/ML
40 INJECTION, SUSPENSION INTRA-ARTICULAR; INTRALESIONAL; INTRAMUSCULAR; SOFT TISSUE ONCE
Status: COMPLETED | OUTPATIENT
Start: 2024-11-21 | End: 2024-11-21

## 2024-11-21 RX ORDER — LIDOCAINE HYDROCHLORIDE 20 MG/ML
1 INJECTION, SOLUTION INFILTRATION; PERINEURAL ONCE
Status: COMPLETED | OUTPATIENT
Start: 2024-11-21 | End: 2024-11-21

## 2024-11-21 RX ADMIN — METHYLPREDNISOLONE ACETATE 40 MG: 40 INJECTION, SUSPENSION INTRA-ARTICULAR; INTRALESIONAL; INTRAMUSCULAR; SOFT TISSUE at 09:33

## 2024-11-21 RX ADMIN — LIDOCAINE HYDROCHLORIDE 1 ML: 20 INJECTION, SOLUTION INFILTRATION; PERINEURAL at 09:33

## 2024-11-21 NOTE — PROGRESS NOTES
"Procedure   Nerve Block    Date/Time: 11/21/2024 9:36 AM    Performed by: Vincent Gilman II, MD  Authorized by: Vincent Gilman II, MD  Consent: Verbal consent obtained. Written consent obtained.  Risks and benefits: risks, benefits and alternatives were discussed  Consent given by: patient  Patient understanding: patient states understanding of the procedure being performed  Patient consent: the patient's understanding of the procedure matches consent given  Procedure consent: procedure consent matches procedure scheduled  Required items: required blood products, implants, devices, and special equipment available  Patient identity confirmed: verbally with patient and provided demographic data  Time out: Immediately prior to procedure a \"time out\" was called to verify the correct patient, procedure, equipment, support staff and site/side marked as required.  Indications comments: occipital neuralgia  Body area: head  Nerve: greater occipital  Laterality: bilateral.    Sedation:  Patient sedated: no    Patient position: sitting  Needle size: 27 G  Location technique: anatomical landmarks  Patient tolerance: Patient tolerated the procedure well with no immediate complications  Comments: 1 cc of both Depo-Medrol and 2% lidocaine without epinephrine were drawn into 2, 3 cc syringes.  1 cc of this mixture was injected at the site of the greater occipital nerve bilaterally.         ONB Bilateral  "

## 2024-11-27 ENCOUNTER — TRANSCRIBE ORDERS (OUTPATIENT)
Dept: ADMINISTRATIVE | Facility: HOSPITAL | Age: 49
End: 2024-11-27
Payer: COMMERCIAL

## 2024-11-27 ENCOUNTER — HOSPITAL ENCOUNTER (OUTPATIENT)
Dept: GENERAL RADIOLOGY | Facility: HOSPITAL | Age: 49
Discharge: HOME | End: 2024-11-27
Payer: COMMERCIAL

## 2024-11-27 ENCOUNTER — TRANSCRIBE ORDERS (OUTPATIENT)
Dept: GENERAL RADIOLOGY | Facility: HOSPITAL | Age: 49
End: 2024-11-27
Payer: COMMERCIAL

## 2024-11-27 DIAGNOSIS — M53.3 SACROILIAC JOINT DYSFUNCTION: ICD-10-CM

## 2024-11-27 DIAGNOSIS — M53.3 SACROILIAC JOINT DYSFUNCTION: Primary | ICD-10-CM

## 2024-11-27 PROCEDURE — 72202 X-RAY EXAM SI JOINTS 3/> VWS: CPT

## 2024-12-04 RX ORDER — ONDANSETRON 4 MG/1
TABLET, ORALLY DISINTEGRATING ORAL
Qty: 12 TABLET | Status: SHIPPED | OUTPATIENT
Start: 2024-12-04

## 2024-12-18 ENCOUNTER — OFFICE VISIT (OUTPATIENT)
Dept: FAMILY MEDICINE CLINIC | Facility: CLINIC | Age: 49
End: 2024-12-18
Payer: COMMERCIAL

## 2024-12-18 VITALS
RESPIRATION RATE: 16 BRPM | DIASTOLIC BLOOD PRESSURE: 86 MMHG | OXYGEN SATURATION: 100 % | HEART RATE: 63 BPM | BODY MASS INDEX: 26.07 KG/M2 | HEIGHT: 68 IN | SYSTOLIC BLOOD PRESSURE: 138 MMHG | WEIGHT: 172 LBS | TEMPERATURE: 97.4 F

## 2024-12-18 DIAGNOSIS — I10 ESSENTIAL HYPERTENSION: ICD-10-CM

## 2024-12-18 DIAGNOSIS — E55.9 VITAMIN D DEFICIENCY: ICD-10-CM

## 2024-12-18 DIAGNOSIS — G43.109 BASILAR MIGRAINE: ICD-10-CM

## 2024-12-18 DIAGNOSIS — E78.2 MIXED HYPERLIPIDEMIA: ICD-10-CM

## 2024-12-18 DIAGNOSIS — Z12.31 ENCOUNTER FOR SCREENING MAMMOGRAM FOR BREAST CANCER: Primary | ICD-10-CM

## 2024-12-18 DIAGNOSIS — M46.1 SACROILIITIS, NOT ELSEWHERE CLASSIFIED: ICD-10-CM

## 2024-12-18 DIAGNOSIS — Z12.11 SCREEN FOR COLON CANCER: ICD-10-CM

## 2024-12-18 PROCEDURE — 90472 IMMUNIZATION ADMIN EACH ADD: CPT | Performed by: PHYSICIAN ASSISTANT

## 2024-12-18 PROCEDURE — 99214 OFFICE O/P EST MOD 30 MIN: CPT | Performed by: PHYSICIAN ASSISTANT

## 2024-12-18 PROCEDURE — 90715 TDAP VACCINE 7 YRS/> IM: CPT | Performed by: PHYSICIAN ASSISTANT

## 2024-12-18 PROCEDURE — 90471 IMMUNIZATION ADMIN: CPT | Performed by: PHYSICIAN ASSISTANT

## 2024-12-18 PROCEDURE — 90656 IIV3 VACC NO PRSV 0.5 ML IM: CPT | Performed by: PHYSICIAN ASSISTANT

## 2024-12-18 NOTE — LETTER
[facility]  Vaccine Consent Form    Patient Name:  More Payne  Patient :  1975     Vaccine(s) Ordered    Fluzone >6mos  Tdap Vaccine Greater Than or Equal To 8yo IM        Screening Checklist  The following questions should be completed prior to vaccination. If you answer “yes” to any question, it does not necessarily mean you should not be vaccinated. It just means we may need to clarify or ask more questions. If a question is unclear, please ask your healthcare provider to explain it.    Yes No   Any fever or moderate to severe illness today (mild illness and/or antibiotic treatment are not contraindications)?     Do you have a history of a serious reaction to any previous vaccinations, such as anaphylaxis, encephalopathy within 7 days, Guillain-Springfield syndrome within 6 weeks, seizure?     Have you received any live vaccine(s) (e.g MMR, VIKA) or any other vaccines in the last month (to ensure duplicate doses aren't given)?     Do you have an anaphylactic allergy to latex (DTaP, DTaP-IPV, Hep A, Hep B, MenB, RV, Td, Tdap), baker’s yeast (Hep B, HPV), polysorbates (RSV, nirsevimab, PCV 20, Rotavirrus, Tdap, Shingrix), or gelatin (VIKA, MMR)?     Do you have an anaphylactic allergy to neomycin (Rabies, VIKA, MMR, IPV, Hep A), polymyxin B (IPV), or streptomycin (IPV)?      Any cancer, leukemia, AIDS, or other immune system disorder? (VIKA, MMR, RV)     Do you have a parent, brother, or sister with an immune system problem (if immune competence of vaccine recipient clinically verified, can proceed)? (MMR, VIKA)     Any recent steroid treatments for >2 weeks, chemotherapy, or radiation treatment? (VIKA, MMR)     Have you received antibody-containing blood transfusions or IVIG in the past 11 months (recommended interval is dependent on product)? (MMR, VIKA)     Have you taken antiviral drugs (acyclovir, famciclovir, valacyclovir for VIKA) in the last 24 or 48 hours, respectively?      Are you pregnant or planning  "to become pregnant within 1 month? (VIKA, MMR, HPV, IPV, MenB, Abrexvy; For Hep B- refer to Engerix-B; For RSV - Abrysvo is indicated for 32-36 weeks of pregnancy from September to January)     For infants, have you ever been told your child has had intussusception or a medical emergency involving obstruction of the intestine (Rotavirus)? If not for an infant, can skip this question.         *Ordering Physicians/APC should be consulted if \"yes\" is checked by the patient or guardian above.  I have received, read, and understand the Vaccine Information Statement (VIS) for each vaccine ordered.  I have considered my or my child's health status as well as the health status of my close contacts.  I have taken the opportunity to discuss my vaccine questions with my or my child's health care provider.   I have requested that the ordered vaccine(s) be given to me or my child.  I understand the benefits and risks of the vaccines.  I understand that I should remain in the clinic for 15 minutes after receiving the vaccine(s).  _________________________________________________________  Signature of Patient or Parent/Legal Guardian ____________________  Date   "

## 2024-12-18 NOTE — PROGRESS NOTES
"Subjective    Since the last visit, she has overall felt tired.  She has Primary Hypertension and well controlled on current medication, Hyperlipidemia and working on this with diet and exercise, Vitamin D deficiency and labs are at goal >30 ng/mL, and Migraine headaches managed by their Specialist.  she has been compliant with current medications have reviewed them.  The patient denies medication side effects.  Will refill medications----if cardiology does not send her refills on valsartan and hydrochlorothiazide. /86 (BP Location: Left arm, Patient Position: Sitting, Cuff Size: Adult)   Pulse 63   Temp 97.4 °F (36.3 °C) (Temporal)   Resp 16   Ht 172.7 cm (67.99\")   Wt 78 kg (172 lb)   SpO2 100%   BMI 26.16 kg/m² .  BMI is >= 25 and <30. (Overweight) The following options were offered after discussion;: weight loss educational material (shared in after visit summary), exercise counseling/recommendations, and nutrition counseling/recommendations      Results for orders placed or performed in visit on 04/22/24   POCT rapid strep A    Collection Time: 04/22/24  4:39 PM    Specimen: Swab   Result Value Ref Range    Rapid Strep A Screen Negative Negative, VALID, INVALID, Not Performed    Internal Control Passed Passed    Lot Number [id number]     Expiration Date 07/09/2025    POCT Influenza A/B    Collection Time: 04/22/24  4:41 PM    Specimen: Swab   Result Value Ref Range    Rapid Influenza A Ag Negative Negative    Rapid Influenza B Ag Negative Negative    Internal Control Passed Passed    Lot Number [id number]     Expiration Date 12/12/2025              Dr Zapata is cardio--- last heart cath was 2/3/2021 negative and last echo same date normal noted incidental finding left pleural effusion---HTN controlled---  Last visit with cardiology was 11/13/2023 noting hypertension was well-controlled on medication and notes that calcium score was 0  Was in ER 3-2-24----removed eyelash under contact ---resolved  Had " lumbar surgery Dr George 12-15-23---f/u done 4-9-24---had f/u 7-9-24---bones healed---10-24-24----noting her back pain was from her SI joints and she is seeing Dr. Benson and to have surgery for fusion, was seen 12-10-24-----has this set up for 12-30-24.  Sees Dr Gilman for migraines----treats her for basilar migraine and occipital neuralgia does do injections also has her on Ubrelvy and daily verapamil for headache suppression  Has seen DR Lemus and is + HLA B12 ---did try TNF blocker---no help----rash with NSAIDs  Sees DR Velázquez for pain management--- has her on Robaxin, Percocet, gabapentin  DR Watson right hip ---9-18-24---just had f/u. -----f/u 1 yr.    Sees BNONIE Monge---for anxiety and bipolar---Ativan PRN.      Also sees Dr Bella---neurosurgeon ---saw her 4-6-22 and PRN --f/u on the right periventricular heterotropias and small right temporal flair signal-----watching this for possible dysplasia versus low-grade glioma    I reviewed her labs from [facility] 12/7/2024 noting CMP, A1c, vitamin D, magnesium are all in normal range.  I do want to update her mammogram screening also needs labs to update her B12 and folic acid thyroid labs and lipids.    Normal EKG on 8/30/2024  History of Present Illness         The following portions of the patient's history were reviewed and updated as appropriate: allergies, current medications, past family history, past medical history, past social history, past surgical history, and problem list.    Review of Systems   Constitutional:  Positive for fatigue. Negative for chills, diaphoresis and fever.   HENT:  Negative for congestion, sore throat and swollen glands.    Respiratory:  Negative for cough.    Cardiovascular:  Negative for chest pain.   Gastrointestinal:  Positive for nausea. Negative for abdominal pain and vomiting.   Genitourinary:  Negative for dysuria.   Musculoskeletal:  Positive for myalgias. Negative for neck pain.   Skin:  Negative for rash.    Neurological:  Positive for weakness and numbness.       Objective   Physical Exam  Vitals and nursing note reviewed.   Constitutional:       General: She is not in acute distress.     Appearance: Normal appearance. She is well-developed. She is not ill-appearing or toxic-appearing.   HENT:      Head: Normocephalic.      Right Ear: External ear normal.      Left Ear: External ear normal.      Nose: Nose normal.      Mouth/Throat:      Pharynx: Oropharynx is clear.   Eyes:      General: No scleral icterus.     Conjunctiva/sclera: Conjunctivae normal.      Pupils: Pupils are equal, round, and reactive to light.   Neck:      Thyroid: No thyromegaly.   Cardiovascular:      Rate and Rhythm: Normal rate and regular rhythm.      Pulses: Normal pulses.      Heart sounds: Normal heart sounds. No murmur heard.  Pulmonary:      Effort: Pulmonary effort is normal. No respiratory distress.      Breath sounds: Normal breath sounds. No rales.   Musculoskeletal:         General: No deformity. Normal range of motion.      Cervical back: Normal range of motion and neck supple.      Right lower leg: No edema.      Left lower leg: No edema.   Skin:     General: Skin is warm and dry.      Findings: No rash.   Neurological:      General: No focal deficit present.      Mental Status: She is alert and oriented to person, place, and time. Mental status is at baseline.   Psychiatric:         Mood and Affect: Mood normal.         Behavior: Behavior normal.         Thought Content: Thought content normal.         Judgment: Judgment normal.         Physical Exam         Results         Assessment & Plan   Diagnoses and all orders for this visit:    1. Encounter for screening mammogram for breast cancer (Primary)  -     Mammo Screening Digital Tomosynthesis Bilateral With CAD    2. Screen for colon cancer  -     Ambulatory Referral to Gastroenterology    3. Mixed hyperlipidemia  -     T4, Free  -     TSH  -     Lipid Panel  -     Vitamin  B12  -     Folate    4. Essential hypertension  -     T4, Free  -     TSH  -     Lipid Panel  -     Vitamin B12  -     Folate    5. Vitamin D deficiency  -     T4, Free  -     TSH  -     Lipid Panel  -     Vitamin B12  -     Folate    6. Basilar migraine    7. Sacroiliitis, not elsewhere classified    Other orders  -     Fluzone >6mos  -     Tdap Vaccine Greater Than or Equal To 6yo IM        Assessment & Plan       Order mammo screen  Do want her to have colonoscopy  Update flu shot and Tdap  She is to have surgery on her SI joint to have a fusion with Dr. Benson 12/30/2024  Followed by our Dr. Gilman neurologist for basilar migraine and occipital neuralgia remains on verapamil, Ubrelvy as needed, trigger point injections  Followed by Dr. Barber for pain management remains on gabapentin, Robaxin, Percocet  Also followed by Dr. Winters neurosurgeon with [facility] for the lumbar DDD  Sees Dr. Jensen for cardiology also maintains her hypertension  Has been to Dr. Lemus for the HLA-B27 ankylosing spondylitis not tolerant to injectable meds with follow-up as needed  Needs follow-up with [facility] BONNIE Jaimes for management of bipolar disorder and anxiety           Answers submitted by the patient for this visit:  Primary Reason for Visit (Submitted on 12/18/2024)  What is the primary reason for your visit?: Problem Not Listed  Problem not listed (Submitted on 12/18/2024)  Chief Complaint: Other medical problem  Reason for appointment: Go over plan of care. Update on recent and upcoming surgeries.  anorexia: No  joint pain: Yes  change in stool: No  headaches: Yes  joint swelling: Yes  vertigo: No  visual change: No  Onset: 1 to 5 years  Chronicity: chronic  Frequency: constantly

## 2024-12-19 ENCOUNTER — TELEPHONE (OUTPATIENT)
Dept: FAMILY MEDICINE CLINIC | Facility: CLINIC | Age: 49
End: 2024-12-19
Payer: COMMERCIAL

## 2024-12-19 LAB
CHOLEST SERPL-MCNC: 287 MG/DL (ref 100–199)
FOLATE SERPL-MCNC: 10.1 NG/ML
HDLC SERPL-MCNC: 62 MG/DL
LDL CALC COMMENT:: ABNORMAL
LDLC SERPL CALC-MCNC: 194 MG/DL (ref 0–99)
T4 FREE SERPL-MCNC: 1.42 NG/DL (ref 0.82–1.77)
TRIGL SERPL-MCNC: 168 MG/DL (ref 0–149)
TSH SERPL DL<=0.005 MIU/L-ACNC: 1.49 UIU/ML (ref 0.45–4.5)
VIT B12 SERPL-MCNC: 919 PG/ML (ref 232–1245)
VLDLC SERPL CALC-MCNC: 31 MG/DL (ref 5–40)

## 2024-12-19 NOTE — TELEPHONE ENCOUNTER
PATIENT ALSO STATES THAT SHE DOESN'T MIND STARTING A CHOLESTEROL MED BUT STATES SHE WASN'T FASTING. PLEASE ADVISE.

## 2024-12-19 NOTE — TELEPHONE ENCOUNTER
"  Caller: More Payne \"Felicita\"     Relationship: SELF     Best call back number:     [phone] (Mobile)       What is your medical concern? FEVER .6, EARS ARE STOPPED UP,ACHEY,     How long has this issue been going on? WORST HIT AT 1 AM     Is your provider already aware of this issue? NO     Have you been treated for this issue? NO   "

## 2024-12-19 NOTE — TELEPHONE ENCOUNTER
Called and spoke with patient and informed her that Kelley will discuss her cholesterol with her at her next appt.    Informed pt that she will need to go to UC if she is still feeling bad and running a fever, due to Kelley being out of the office until 12/27/2024 and we have no appts with any other providers. Pt states that she did get the flu shot yesterday and did not know if it could be from that. She will go to UC if needed

## 2025-02-17 ENCOUNTER — TELEPHONE (OUTPATIENT)
Dept: FAMILY MEDICINE CLINIC | Facility: CLINIC | Age: 50
End: 2025-02-17
Payer: COMMERCIAL

## 2025-02-17 NOTE — TELEPHONE ENCOUNTER
Pt called and said she went to [facility] twice with left arm pain, numbness. They told her to follow up with pcp. They think it may be a pinched nerve. Pain is intermittently having pain radiate to right shoulder. She is in a lot of pain.

## 2025-02-17 NOTE — TELEPHONE ENCOUNTER
She needs to see pain management she has already had a CT of her cervical spine and is not surgical  Has been to Dr Alonso

## 2025-02-18 NOTE — TELEPHONE ENCOUNTER
I called and spoke with patient and she wants to know if she still needs to keep her f/up that she scheduled on 2/25/2025. It is for a Hospital f/up. The Hospital wants her to have an MRI and she does not know if you would be the one to order this or Pain Management Doctor. She does also have an appt scheduled with Pain Mangement. Please advise and I will call patient back to inform

## 2025-02-18 NOTE — TELEPHONE ENCOUNTER
I cannot order MRI without seeing her and documenting medical necessity.  I advise keeping appointment

## 2025-02-28 ENCOUNTER — READMISSION MANAGEMENT (OUTPATIENT)
Dept: CALL CENTER | Facility: HOSPITAL | Age: 50
End: 2025-02-28
Payer: COMMERCIAL

## 2025-03-01 NOTE — OUTREACH NOTE
Prep Survey      Flowsheet Row Responses   [facility] facility patient discharged from? Non-BH   Is LACE score < 7 ? Non-BH Discharge   Eligibility [facility]   Date of Admission 02/26/25   Date of Discharge 02/28/25   Discharge Disposition Home or Self Care   Discharge diagnosis ANTERIOR CERVICAL FUSION WITH INST   Does the patient have one of the following disease processes/diagnoses(primary or secondary)? General Surgery   Prep survey completed? Yes            FELICITA LEZAMA - Registered Nurse

## 2025-03-03 ENCOUNTER — TRANSITIONAL CARE MANAGEMENT TELEPHONE ENCOUNTER (OUTPATIENT)
Dept: CALL CENTER | Facility: HOSPITAL | Age: 50
End: 2025-03-03
Payer: COMMERCIAL

## 2025-03-03 NOTE — OUTREACH NOTE
Call Center TCM Note      Flowsheet Row Responses   [facility] patient discharged from? Non-  [[facility]]   Does the patient have one of the following disease processes/diagnoses(primary or secondary)? General Surgery   TCM attempt successful? Yes   Call start time 1533   Call end time 1535   Discharge diagnosis ANTERIOR CERVICAL FUSION WITH INST   Meds reviewed with patient/caregiver? Yes   Is the patient having any side effects they believe may be caused by any medication additions or changes? No   Does the patient have all medications ordered at discharge? Yes   Is the patient taking all medications as directed (includes completed medication regime)? Yes   Does the patient have an appointment with their PCP within 7-14 days of discharge? No   Nursing Interventions Patient desires to follow up with specialty only, Routed TCM call to PCP office, Patient declined scheduling/rescheduling appointment at this time   Has home health visited the patient within 72 hours of discharge? N/A   Psychosocial issues? No   Did the patient receive a copy of their discharge instructions? Yes   Nursing interventions Reviewed instructions with patient   What is the patient's perception of their health status since discharge? Improving   Is the patient/caregiver able to teach back signs and symptoms related to disease process for when to call PCP? Yes   Is the patient/caregiver able to teach back signs and symptoms related to disease process for when to call 911? Yes   Is the patient/caregiver able to teach back the hierarchy of who to call/visit for symptoms/problems? PCP, Specialist, Home health nurse, Urgent Care, ED, 911 Yes   If the patient is a current smoker, are they able to teach back resources for cessation? Not a smoker   TCM call completed? Yes   Wrap up additional comments D/C DX: C-spine fusion - Pt doing pretty well, she is tired, and sore, also has sore throat but feels this may be from anesthesia. No  brittni. FIrst POST OP appt is 03/25/2025. Pt declines TCM APPT with PCP Kelley Ferguson for now, pt will keep scheduled ov in 06/2025. [place] [facility] if sooner appt needed   Call end time 1537            Pau Ribeiro MA    3/3/2025, 15:37 EST

## 2025-05-15 ENCOUNTER — OFFICE VISIT (OUTPATIENT)
Dept: FAMILY MEDICINE CLINIC | Facility: CLINIC | Age: 50
End: 2025-05-15
Payer: COMMERCIAL

## 2025-05-15 VITALS
OXYGEN SATURATION: 99 % | SYSTOLIC BLOOD PRESSURE: 128 MMHG | DIASTOLIC BLOOD PRESSURE: 86 MMHG | TEMPERATURE: 97.5 F | BODY MASS INDEX: 27.71 KG/M2 | HEART RATE: 74 BPM | HEIGHT: 68 IN | WEIGHT: 182.8 LBS

## 2025-05-15 DIAGNOSIS — M45.0 ANKYLOSING SPONDYLITIS OF MULTIPLE SITES IN SPINE: ICD-10-CM

## 2025-05-15 DIAGNOSIS — E78.2 MIXED HYPERLIPIDEMIA: ICD-10-CM

## 2025-05-15 DIAGNOSIS — I10 ESSENTIAL HYPERTENSION: Primary | ICD-10-CM

## 2025-05-15 DIAGNOSIS — E66.3 OVERWEIGHT WITH BODY MASS INDEX (BMI) OF 27 TO 27.9 IN ADULT: ICD-10-CM

## 2025-05-15 DIAGNOSIS — F41.9 ANXIETY AND DEPRESSION: ICD-10-CM
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
## 2025-05-15 DIAGNOSIS — G89.4 CHRONIC PAIN DISORDER: ICD-10-CM

## 2025-05-15 DIAGNOSIS — E55.9 VITAMIN D DEFICIENCY: ICD-10-CM

## 2025-05-15 DIAGNOSIS — S30.861A TICK BITE OF ABDOMEN, INITIAL ENCOUNTER: ICD-10-CM

## 2025-05-15 DIAGNOSIS — R53.83 TIRED: ICD-10-CM

## 2025-05-15 DIAGNOSIS — F32.A ANXIETY AND DEPRESSION: ICD-10-CM

## 2025-05-15 RX ORDER — SEMAGLUTIDE 0.25 MG/.5ML
0.25 INJECTION, SOLUTION SUBCUTANEOUS WEEKLY
Qty: 2 ML | Refills: 0 | Status: SHIPPED | OUTPATIENT
Start: 2025-05-15 | End: 2025-05-15

## 2025-05-15 RX ORDER — SEMAGLUTIDE 0.25 MG/.5ML
0.25 INJECTION, SOLUTION SUBCUTANEOUS WEEKLY
Qty: 2 ML | Refills: 0 | Status: SHIPPED | OUTPATIENT
Start: 2025-05-15

## 2025-05-15 RX ORDER — DOXYCYCLINE 100 MG/1
100 CAPSULE ORAL 2 TIMES DAILY
Qty: 20 CAPSULE | Refills: 0 | Status: SHIPPED | OUTPATIENT
Start: 2025-05-15

## 2025-05-15 NOTE — PROGRESS NOTES
"Subjective   More Payne is a 50 y.o. female.     Fatigue  Symptoms include fatigue, myalgias, nausea, neck pain and weakness.    Pertinent negative symptoms include no abdominal pain, no chest pain, no chills, no congestion, no cough, no diaphoresis, no fever, no numbness, no rash, no sore throat, no swollen glands, no dysuria and no vomiting.   Diabetes  Associated symptoms:     fatigue and weakness      no chest pain        Since the last visit, she has overall felt tired.  She has Primary Hypertension and well controlled on current medication, Hyperlipidemia and working on this with diet and exercise, Vitamin D deficiency and labs are at goal >30 ng/mL, and Migraine headaches managed by their Specialist.She has been more tired.  Update labs.  She is eating high protein, low carb----walks for exercise as pain permits.  BMI now 27 and comorbidity of HTN, mixed hyperlipidemia, ankylosing spondylitis with SI pain.   she has been compliant with current medications have reviewed them.  The patient denies medication side effects.  Will refill medications. /86   Pulse 74   Temp 97.5 °F (36.4 °C) (Temporal)   Ht 172.7 cm (68\")   Wt 82.9 kg (182 lb 12.8 oz)   LMP 04/16/2025 (Approximate)   SpO2 99%   BMI 27.79 kg/m² .          Results for orders placed or performed in visit on 12/18/24   T4, Free    Collection Time: 12/18/24  2:17 PM    Specimen: Blood   Result Value Ref Range    Free T4 1.42 0.82 - 1.77 ng/dL   TSH    Collection Time: 12/18/24  2:17 PM    Specimen: Blood   Result Value Ref Range    TSH 1.490 0.450 - 4.500 uIU/mL   Lipid Panel    Collection Time: 12/18/24  2:17 PM    Specimen: Blood   Result Value Ref Range    Total Cholesterol 287 (H) 100 - 199 mg/dL    Triglycerides 168 (H) 0 - 149 mg/dL    HDL Cholesterol 62 >39 mg/dL    VLDL Cholesterol Wilson 31 5 - 40 mg/dL    LDL Chol Calc ([facility]) 194 (H) 0 - 99 mg/dL    LDL Calc Comment Comment    Vitamin B12    Collection Time: 12/18/24  2:17 PM    " Specimen: Blood   Result Value Ref Range    Vitamin B-12 919 232 - 1,245 pg/mL   Folate    Collection Time: 12/18/24  2:17 PM    Specimen: Blood   Result Value Ref Range    Folate 10.1 >3.0 ng/mL   Lipids were high 12-18-24  Intol to Lipitor and Crestor  She has altered her diet and plan to repeat labs today  Since our last visit 12/18/2024 she has had surgery--- neurosurgeon Dr. George--- for cervical radiculopathy and that was done on 2/26/2025---has been following up with his office    Still sees pain management for SI pain --ankylosing spondylitis--- trying to get approved for surgical repair SI.    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 -----f/u 1 yr.     Not snoring    Last visit cardio [place]---DR Zapata 4-14-25-    CARDIAC HISTORY  Chest pain/pressure  Normal CCTA 2/3/2021  Large RCA which supplies the lateral wall  Most recent ischemic evaluation pharmacologic SPECT 11/4/23-normal  Most recent echocardiogram 2/3/2021 -normal. EF 60-65%   Previously on valsartan and hydrochlorothiazide. Both discontinued after cervical spine surgery 2/2025 with subsequent lower blood pressure values   Also on verapamil but that is for headaches   Normal EKG    Lab Results   Component Value Date    HGBA1C 5.6 02/18/2022     Hemoglobin A1c on 12/17/2024 was 5.2%    Found tick 2 weeks ago---abd----non healing area left----not sure if on >24 hours.  Pulled off tiny tick      Not depressed  She does want to start Wegovy to get weight down and reduce her comorbidities.  No prior pancreatitis and no family history of thyroid cancer or M EN.    The following portions of the patient's history were reviewed and updated as appropriate: allergies, current medications,  past family history, past medical history, past social history, past surgical history, and problem list.    Review of Systems   Constitutional:  Positive for fatigue. Negative for chills, diaphoresis and fever.   HENT:  Negative for congestion, sore throat and swollen glands.    Respiratory:  Negative for cough.    Cardiovascular:  Negative for chest pain.   Gastrointestinal:  Positive for nausea. Negative for abdominal pain and vomiting.   Genitourinary:  Negative for dysuria.   Musculoskeletal:  Positive for myalgias and neck pain.   Skin:  Positive for skin lesions. Negative for rash.   Neurological:  Positive for weakness. Negative for numbness.       Objective   Physical Exam  Vitals and nursing note reviewed.   Constitutional:       General: She is not in acute distress.     Appearance: Normal appearance. She is well-developed. She is not ill-appearing or toxic-appearing.   HENT:      Head: Normocephalic.      Right Ear: External ear normal.      Left Ear: External ear normal.      Nose: Nose normal.      Mouth/Throat:      Pharynx: Oropharynx is clear.   Eyes:      General: No scleral icterus.     Conjunctiva/sclera: Conjunctivae normal.      Pupils: Pupils are equal, round, and reactive to light.   Neck:      Thyroid: No thyromegaly.   Cardiovascular:      Rate and Rhythm: Normal rate and regular rhythm.      Heart sounds: Normal heart sounds. No murmur heard.  Pulmonary:      Effort: Pulmonary effort is normal. No respiratory distress.      Breath sounds: Normal breath sounds.   Musculoskeletal:         General: No deformity. Normal range of motion.      Cervical back: Normal range of motion and neck supple.   Skin:     General: Skin is warm and dry.      Findings: Lesion present. No rash.      Comments: Pink papular area tick bites left side abdomen towards the flank area is scabbed and just below this is pink papular   Neurological:      General: No focal deficit present.      Mental Status: She is alert  and oriented to person, place, and time. Mental status is at baseline.   Psychiatric:         Mood and Affect: Mood normal.         Behavior: Behavior normal.         Thought Content: Thought content normal.         Judgment: Judgment normal.           Assessment & Plan   Diagnoses and all orders for this visit:    1. Essential hypertension (Primary)  -     Comprehensive metabolic panel  -     Lipid panel  -     CBC and Differential  -     TSH  -     Urinalysis With Microscopic - Urine, Clean Catch  -     T4, Free  -     T3, Free  -     Vitamin D,25-Hydroxy  -     Vitamin B12  -     Folate  -     Hemoglobin A1c  -     Ehrlichia Antibody Panel  -     Lyme Disease Total Antibody With Reflex to Immunoassay  -     Rickettsia Species DNA, Real-Time PCR    2. Mixed hyperlipidemia  -     Comprehensive metabolic panel  -     Lipid panel  -     CBC and Differential  -     TSH  -     Urinalysis With Microscopic - Urine, Clean Catch  -     T4, Free  -     T3, Free  -     Vitamin D,25-Hydroxy  -     Vitamin B12  -     Folate  -     Hemoglobin A1c  -     Ehrlichia Antibody Panel  -     Lyme Disease Total Antibody With Reflex to Immunoassay  -     Rickettsia Species DNA, Real-Time PCR    3. Tick bite of abdomen, initial encounter  -     Comprehensive metabolic panel  -     Lipid panel  -     CBC and Differential  -     TSH  -     Urinalysis With Microscopic - Urine, Clean Catch  -     T4, Free  -     T3, Free  -     Vitamin D,25-Hydroxy  -     Vitamin B12  -     Folate  -     Hemoglobin A1c  -     Ehrlichia Antibody Panel  -     Lyme Disease Total Antibody With Reflex to Immunoassay  -     Rickettsia Species DNA, Real-Time PCR    4. Vitamin D deficiency  -     Comprehensive metabolic panel  -     Lipid panel  -     CBC and Differential  -     TSH  -     Urinalysis With Microscopic - Urine, Clean Catch  -     T4, Free  -     T3, Free  -     Vitamin D,25-Hydroxy  -     Vitamin B12  -     Folate  -     Hemoglobin A1c  -      Ehrlichia Antibody Panel  -     Lyme Disease Total Antibody With Reflex to Immunoassay  -     Rickettsia Species DNA, Real-Time PCR    5. Anxiety and depression  -     Comprehensive metabolic panel  -     Lipid panel  -     CBC and Differential  -     TSH  -     Urinalysis With Microscopic - Urine, Clean Catch  -     T4, Free  -     T3, Free  -     Vitamin D,25-Hydroxy  -     Vitamin B12  -     Folate  -     Hemoglobin A1c  -     Ehrlichia Antibody Panel  -     Lyme Disease Total Antibody With Reflex to Immunoassay  -     Rickettsia Species DNA, Real-Time PCR    6. Chronic pain disorder  -     Comprehensive metabolic panel  -     Lipid panel  -     CBC and Differential  -     TSH  -     Urinalysis With Microscopic - Urine, Clean Catch  -     T4, Free  -     T3, Free  -     Vitamin D,25-Hydroxy  -     Vitamin B12  -     Folate  -     Hemoglobin A1c  -     Ehrlichia Antibody Panel  -     Lyme Disease Total Antibody With Reflex to Immunoassay  -     Rickettsia Species DNA, Real-Time PCR    7. Ankylosing spondylitis of multiple sites in spine  -     Comprehensive metabolic panel  -     Lipid panel  -     CBC and Differential  -     TSH  -     Urinalysis With Microscopic - Urine, Clean Catch  -     T4, Free  -     T3, Free  -     Vitamin D,25-Hydroxy  -     Vitamin B12  -     Folate  -     Hemoglobin A1c  -     Ehrlichia Antibody Panel  -     Lyme Disease Total Antibody With Reflex to Immunoassay  -     Rickettsia Species DNA, Real-Time PCR    8. Tired  -     Comprehensive metabolic panel  -     Lipid panel  -     CBC and Differential  -     TSH  -     Urinalysis With Microscopic - Urine, Clean Catch  -     T4, Free  -     T3, Free  -     Vitamin D,25-Hydroxy  -     Vitamin B12  -     Folate  -     Hemoglobin A1c  -     Ehrlichia Antibody Panel  -     Lyme Disease Total Antibody With Reflex to Immunoassay  -     Rickettsia Species DNA, Real-Time PCR    9. Overweight with body mass index (BMI) of 27 to 27.9 in  adult    Other orders  -     doxycycline (VIBRAMYCIN) 100 MG capsule; Take 1 capsule by mouth 2 (Two) Times a Day. For infection  Dispense: 20 capsule; Refill: 0      Still need to have mammo  Has f/u neurosurgery for the cervical spondylosis status post repair--DR Doris Alonso is pain management--SI pain and ankylosing spondylitis  Dr. Zapata is her [place] cardiologist  Patient aware there is a 3% risk of acute pancreatitis with taking a GLP-1 agonist and that will result in hospitalization also there is nausea vomiting diarrhea constipation commonly with this medication.  It delays stomach emptying and must have small frequent meals  Plan to order tick labs with this tick bite and update labs again due to fatigue  See me for follow-up after being on Wegovy 3 months

## 2025-05-16 ENCOUNTER — PRIOR AUTHORIZATION (OUTPATIENT)
Dept: FAMILY MEDICINE CLINIC | Facility: CLINIC | Age: 50
End: 2025-05-16
Payer: COMMERCIAL

## 2025-05-16 NOTE — TELEPHONE ENCOUNTER
TAWANA MORSE (Key: [id number]) - [id number]  Wegovy 0.25MG/0.5ML auto-injectors  status: PA RequestCreated: May 15th, 2025 [phone]: May 16th, 2025

## 2025-05-20 ENCOUNTER — RESULTS FOLLOW-UP (OUTPATIENT)
Dept: FAMILY MEDICINE CLINIC | Facility: CLINIC | Age: 50
End: 2025-05-20
Payer: COMMERCIAL

## 2025-05-20 LAB
25(OH)D3+25(OH)D2 SERPL-MCNC: 26.9 NG/ML (ref 30–100)
A PHAGOCYTOPH IGG TITR SER IF: NEGATIVE {TITER}
A PHAGOCYTOPH IGM TITR SER IF: NEGATIVE {TITER}
ALBUMIN SERPL-MCNC: 4.2 G/DL (ref 3.5–5.2)
ALBUMIN/GLOB SERPL: 1.6 G/DL
ALP SERPL-CCNC: 59 U/L (ref 39–117)
ALT SERPL-CCNC: 19 U/L (ref 1–33)
APPEARANCE UR: CLEAR
AST SERPL-CCNC: 18 U/L (ref 1–32)
B BURGDOR IGG+IGM SER QL IA: NEGATIVE
BACTERIA #/AREA URNS HPF: ABNORMAL /HPF
BASOPHILS # BLD AUTO: 0.02 10*3/MM3 (ref 0–0.2)
BASOPHILS NFR BLD AUTO: 0.3 % (ref 0–1.5)
BILIRUB SERPL-MCNC: 0.2 MG/DL (ref 0–1.2)
BILIRUB UR QL STRIP: NEGATIVE
BUN SERPL-MCNC: 17 MG/DL (ref 6–20)
BUN/CREAT SERPL: 25 (ref 7–25)
CALCIUM SERPL-MCNC: 9.1 MG/DL (ref 8.6–10.5)
CASTS URNS MICRO: ABNORMAL
CHLORIDE SERPL-SCNC: 104 MMOL/L (ref 98–107)
CHOLEST SERPL-MCNC: 258 MG/DL (ref 0–200)
CO2 SERPL-SCNC: 23.7 MMOL/L (ref 22–29)
COLOR UR: YELLOW
CREAT SERPL-MCNC: 0.68 MG/DL (ref 0.57–1)
E CHAFFEENSIS IGG TITR SER IF: NEGATIVE {TITER}
E CHAFFEENSIS IGM TITR SER IF: NEGATIVE {TITER}
EGFRCR SERPLBLD CKD-EPI 2021: 106.9 ML/MIN/1.73
EOSINOPHIL # BLD AUTO: 0.09 10*3/MM3 (ref 0–0.4)
EOSINOPHIL NFR BLD AUTO: 1.5 % (ref 0.3–6.2)
EPI CELLS #/AREA URNS HPF: ABNORMAL /HPF
ERYTHROCYTE [DISTWIDTH] IN BLOOD BY AUTOMATED COUNT: 14 % (ref 12.3–15.4)
FOLATE SERPL-MCNC: 9.28 NG/ML (ref 4.78–24.2)
GLOBULIN SER CALC-MCNC: 2.6 GM/DL
GLUCOSE SERPL-MCNC: 95 MG/DL (ref 65–99)
GLUCOSE UR QL STRIP: NEGATIVE
HBA1C MFR BLD: 5.5 % (ref 4.8–5.6)
HCT VFR BLD AUTO: 38.8 % (ref 34–46.6)
HDLC SERPL-MCNC: 63 MG/DL (ref 40–60)
HGB BLD-MCNC: 12.3 G/DL (ref 12–15.9)
HGB UR QL STRIP: NEGATIVE
IMM GRANULOCYTES # BLD AUTO: 0.02 10*3/MM3 (ref 0–0.05)
IMM GRANULOCYTES NFR BLD AUTO: 0.3 % (ref 0–0.5)
KETONES UR QL STRIP: NEGATIVE
LDLC SERPL CALC-MCNC: 177 MG/DL (ref 0–100)
LEUKOCYTE ESTERASE UR QL STRIP: NEGATIVE
LYMPHOCYTES # BLD AUTO: 1.64 10*3/MM3 (ref 0.7–3.1)
LYMPHOCYTES NFR BLD AUTO: 27.6 % (ref 19.6–45.3)
MCH RBC QN AUTO: 29.2 PG (ref 26.6–33)
MCHC RBC AUTO-ENTMCNC: 31.7 G/DL (ref 31.5–35.7)
MCV RBC AUTO: 92.2 FL (ref 79–97)
MONOCYTES # BLD AUTO: 0.64 10*3/MM3 (ref 0.1–0.9)
MONOCYTES NFR BLD AUTO: 10.8 % (ref 5–12)
NEUTROPHILS # BLD AUTO: 3.53 10*3/MM3 (ref 1.7–7)
NEUTROPHILS NFR BLD AUTO: 59.5 % (ref 42.7–76)
NITRITE UR QL STRIP: NEGATIVE
NRBC BLD AUTO-RTO: 0 /100 WBC (ref 0–0.2)
PH UR STRIP: 5.5 [PH] (ref 5–8)
PLATELET # BLD AUTO: 251 10*3/MM3 (ref 140–450)
POTASSIUM SERPL-SCNC: 4.4 MMOL/L (ref 3.5–5.2)
PROT SERPL-MCNC: 6.8 G/DL (ref 6–8.5)
PROT UR QL STRIP: NEGATIVE
RBC # BLD AUTO: 4.21 10*6/MM3 (ref 3.77–5.28)
RBC #/AREA URNS HPF: ABNORMAL /HPF
RESULT COMMENT:: NORMAL
RICKETTSIA RICKETTSII DNA, RT: NOT DETECTED
SODIUM SERPL-SCNC: 138 MMOL/L (ref 136–145)
SP GR UR STRIP: 1.03 (ref 1–1.03)
T3FREE SERPL-MCNC: 3.6 PG/ML (ref 2–4.4)
T4 FREE SERPL-MCNC: 1.25 NG/DL (ref 0.92–1.68)
TRIGL SERPL-MCNC: 102 MG/DL (ref 0–150)
TSH SERPL DL<=0.005 MIU/L-ACNC: 2.35 UIU/ML (ref 0.27–4.2)
UROBILINOGEN UR STRIP-MCNC: NORMAL MG/DL
VIT B12 SERPL-MCNC: 601 PG/ML (ref 211–946)
VLDLC SERPL CALC-MCNC: 18 MG/DL (ref 5–40)
WBC # BLD AUTO: 5.94 10*3/MM3 (ref 3.4–10.8)
WBC #/AREA URNS HPF: ABNORMAL /HPF

## 2025-06-03 DIAGNOSIS — G43.109 BASILAR MIGRAINE: ICD-10-CM

## 2025-06-03 RX ORDER — VERAPAMIL HYDROCHLORIDE 40 MG/1
40 TABLET ORAL 2 TIMES DAILY
Qty: 180 TABLET | Refills: 1 | Status: SHIPPED | OUTPATIENT
Start: 2025-06-03

## 2025-07-23 ENCOUNTER — TELEPHONE (OUTPATIENT)
Dept: NEUROLOGY | Facility: CLINIC | Age: 50
End: 2025-07-23
Payer: COMMERCIAL

## 2025-07-23 NOTE — TELEPHONE ENCOUNTER
Pt called requesting next OV on 7/30 to be a my chart video visit.    Sent message to dr. Mukul SAUER

## 2025-07-24 ENCOUNTER — OFFICE VISIT (OUTPATIENT)
Dept: NEUROLOGY | Facility: CLINIC | Age: 50
End: 2025-07-24
Payer: COMMERCIAL

## 2025-07-24 VITALS
SYSTOLIC BLOOD PRESSURE: 122 MMHG | HEIGHT: 68 IN | WEIGHT: 175 LBS | OXYGEN SATURATION: 95 % | DIASTOLIC BLOOD PRESSURE: 84 MMHG | BODY MASS INDEX: 26.52 KG/M2 | HEART RATE: 71 BPM

## 2025-07-24 DIAGNOSIS — M54.81 BILATERAL OCCIPITAL NEURALGIA: ICD-10-CM

## 2025-07-24 DIAGNOSIS — G43.109 BASILAR MIGRAINE: Primary | ICD-10-CM

## 2025-07-24 DIAGNOSIS — G43.009 MIGRAINE WITHOUT AURA AND WITHOUT STATUS MIGRAINOSUS, NOT INTRACTABLE: ICD-10-CM

## 2025-07-24 RX ORDER — METHYLPREDNISOLONE ACETATE 40 MG/ML
40 INJECTION, SUSPENSION INTRA-ARTICULAR; INTRALESIONAL; INTRAMUSCULAR; SOFT TISSUE ONCE
Status: SHIPPED | OUTPATIENT
Start: 2025-07-24

## 2025-07-24 RX ORDER — LIDOCAINE HYDROCHLORIDE 20 MG/ML
1 INJECTION, SOLUTION INFILTRATION; PERINEURAL ONCE
Status: SHIPPED | OUTPATIENT
Start: 2025-07-24

## 2025-07-24 NOTE — PROGRESS NOTES
Chief Complaint   Patient presents with    Basilar migraine    Occipital Neuralgia       Patient ID: More Payne is a 50 y.o. female.    HPI: I had the pleasure of seeing your patient today.  As you may know she is a 50-year-old female here for the management of chronic daily headache and occipital neuralgia.  She has been having more headaches recently.  She did well with the occipital nerve block.  However for the last 30 days she has had a significant increase in the frequency of headaches.  She mentions that the Ubrelvy does work abortively however the headache does return.  She has had more pain on the left temporal region.  She may experience some sensitivity to light and sound.  No focal weakness or numbness.    The following portions of the patient's history were reviewed and updated as appropriate: allergies, current medications, past family history, past medical history, past social history, past surgical history and problem list.    Review of Systems   Constitutional:  Positive for fatigue.   Eyes:  Positive for photophobia (and phon during migraines), pain (during migraines) and visual disturbance (during migraines).   Gastrointestinal:  Positive for nausea (during migraines).   Neurological:  Positive for dizziness, speech difficulty, weakness, light-headedness and headaches. Negative for tremors, seizures, syncope, facial asymmetry and numbness.   Psychiatric/Behavioral:  Positive for agitation, confusion, decreased concentration, dysphoric mood and sleep disturbance. Negative for behavioral problems, hallucinations, self-injury and suicidal ideas. The patient is not nervous/anxious and is not hyperactive.       I have reviewed the review of systems above performed by my medical assistant.      Vitals:    07/24/25 1450   BP: 122/84   Pulse: 71   SpO2: 95%       Neurological Exam  Mental Status  Awake, alert and oriented to person, place and time. Recent and remote memory are intact. Speech is  normal. Language is fluent with no aphasia. Attention and concentration are normal. Fund of knowledge is appropriate for level of education.    Cranial Nerves  CN I: Sense of smell is normal.  CN II: Visual acuity is normal.  CN III, IV, VI: Extraocular movements intact bilaterally. Pupils equal round and reactive to light bilaterally.  CN V: Facial sensation is normal.  CN VII: Full and symmetric facial movement.  CN XI: Shoulder shrug strength is normal.  CN XII: Tongue midline without atrophy or fasciculations.    Motor  Normal muscle bulk throughout. No fasciculations present. Normal muscle tone. No abnormal involuntary movements. No pronator drift.                                             Right                     Left  Rhomboids                            5                          5  Infraspinatus                          5                          5  Supraspinatus                       5                          5  Deltoid                                   5                          5   Biceps                                   5                          5  Brachioradialis                      5                          5   Triceps                                  5                          5   Pronator                                5                          5   Supinator                              5                           5   Wrist flexor                            5                          5   Wrist extensor                       5                          5   Finger flexor                          5                          5   Finger extensor                     5                          5   Interossei                              5                          5   Abductor pollicis brevis         5                          5   Flexor pollicis brevis             5                          5   Opponens pollicis                 5                          5  Extensor digitorum               5                           5  Abductor digiti minimi           5                          5   Abdominal                            5                          5  Glutei                                    5                          5  Hip abductor                         5                          5  Hip adductor                         5                          5   Iliopsoas                               5                          5   Quadriceps                           5                          5   Hamstring                             5                          5   Gastrocnemius                     5                           5   Anterior tibialis                      5                          5   Posterior tibialis                    5                          5   Peroneal                               5                          5  Ankle dorsiflexor                   5                          5  Ankle plantar flexor              5                           5  Extensor hallucis longus      5                           5    Sensory  Sensation is intact to light touch, pinprick, vibration and proprioception in all four extremities.    Reflexes  Deep tendon reflexes are 2+ and symmetric in all four extremities.    Right pathological reflexes: Andreas's absent.  Left pathological reflexes: Andreas's absent.    Coordination    Finger-to-nose, rapid alternating movements and heel-to-shin normal bilaterally without dysmetria.    Gait  Normal casual, toe, heel and tandem gait.       Physical Exam  Vitals reviewed.   Constitutional:       Appearance: She is well-developed.   HENT:      Head: Normocephalic and atraumatic.   Eyes:      Extraocular Movements: Extraocular movements intact.      Pupils: Pupils are equal, round, and reactive to light.   Cardiovascular:      Rate and Rhythm: Normal rate and regular rhythm.   Pulmonary:      Breath sounds: Normal breath sounds.   Musculoskeletal:         General: Normal range of  "motion.   Skin:     General: Skin is warm.   Neurological:      Coordination: Coordination is intact.      Deep Tendon Reflexes: Reflexes are normal and symmetric.   Psychiatric:         Speech: Speech normal.         Nerve Block    Date/Time: 7/24/2025 3:11 PM    Performed by: Vincent Gilman II, MD  Authorized by: Vincent Gilman II, MD  Consent: Verbal consent obtained. Written consent obtained  Risks and benefits: risks, benefits and alternatives were discussed  Consent given by: patient  Patient understanding: patient states understanding of the procedure being performed  Patient consent: the patient's understanding of the procedure matches consent given  Procedure consent: procedure consent matches procedure scheduled  Required items: required blood products, implants, devices, and special equipment available  Patient identity confirmed: verbally with patient and provided demographic data  Time out: Immediately prior to procedure a \"time out\" was called to verify the correct patient, procedure, equipment, support staff and site/side marked as required.  Indications comments: occipital neuralgia  Body area: head  Nerve: greater occipital  Laterality: bilateral.    Sedation:  Patient sedated: no    Patient position: sitting  Needle size: 25 G  Location technique: anatomical landmarks  Local Anesthetic: lidocaine 2% without epinephrine  Patient tolerance: Patient tolerated the procedure well with no immediate complications  Comments: 1 mL of both Depo-Medrol and 2% lidocaine without epinephrine were drawn into two 3 mL syringes. 1 mL of this mixture was injected at the site of the greater occipital nerve, bilaterally, in a fan-shaped distribution.        Assessment/Plan: We will perform an occipital nerve block today.  She will continue with Ubrelvy abortively.  I feel that with the nerve block she will have better prevention.  The Ubrelvy works well abortively.  Otherwise we will see her back in 4 months or " sooner if needed.  A total of 30 minutes was spent face-to-face with the patient today.  Of that greater than 50% of this time was spent discussing signs and symptoms of occipital neuralgia, migraine, chronic daily headache, patient education, plan of care and prognosis.         Diagnoses and all orders for this visit:    1. Basilar migraine (Primary)  -     lidocaine (XYLOCAINE) 2% injection 1 mL  -     methylPREDNISolone acetate (DEPO-medrol) injection 40 mg    2. Bilateral occipital neuralgia  -     lidocaine (XYLOCAINE) 2% injection 1 mL  -     methylPREDNISolone acetate (DEPO-medrol) injection 40 mg  -     Nerve Block    3. Migraine without aura and without status migrainosus, not intractable           Vincent Gilman II, MD

## 2025-08-26 ENCOUNTER — OFFICE VISIT (OUTPATIENT)
Dept: FAMILY MEDICINE CLINIC | Facility: CLINIC | Age: 50
End: 2025-08-26
Payer: COMMERCIAL

## 2025-08-26 VITALS
RESPIRATION RATE: 16 BRPM | HEIGHT: 68 IN | HEART RATE: 68 BPM | BODY MASS INDEX: 26.83 KG/M2 | OXYGEN SATURATION: 97 % | DIASTOLIC BLOOD PRESSURE: 84 MMHG | SYSTOLIC BLOOD PRESSURE: 134 MMHG | WEIGHT: 177 LBS | TEMPERATURE: 97.1 F

## 2025-08-26 DIAGNOSIS — G43.109 BASILAR MIGRAINE: ICD-10-CM

## 2025-08-26 DIAGNOSIS — E78.2 MIXED HYPERLIPIDEMIA: ICD-10-CM

## 2025-08-26 DIAGNOSIS — Z01.818 PREOPERATIVE CLEARANCE: Primary | ICD-10-CM

## 2025-08-26 DIAGNOSIS — M45.0 ANKYLOSING SPONDYLITIS OF MULTIPLE SITES IN SPINE: ICD-10-CM

## 2025-08-26 DIAGNOSIS — E55.9 VITAMIN D DEFICIENCY: ICD-10-CM
